# Patient Record
Sex: FEMALE | URBAN - METROPOLITAN AREA
[De-identification: names, ages, dates, MRNs, and addresses within clinical notes are randomized per-mention and may not be internally consistent; named-entity substitution may affect disease eponyms.]

---

## 2020-12-09 ENCOUNTER — NURSE TRIAGE (OUTPATIENT)
Dept: NURSING | Facility: CLINIC | Age: 60
End: 2020-12-09

## 2020-12-10 NOTE — TELEPHONE ENCOUNTER
"Patients daughter May calling, No CTC on file. Obtained consent from patient over the phone with speak with daughter. Patient is not seen through [facility]. Daughter is not sure where pateint is seen and who her PCP is.     -Hx of Diabetes  -COVID-19 positive recently diagnosed.   -Constant cough  -Temperature is 102 F right now.   -Blood Sugar 365 now, patient has a prescription for metformin, but has not been taking her medication as prescribed.  -Patient is slow to respond per daughter and having cloudiness, but daughter (who is in nursing school) states patient is alert and oriented x4, but daughter thinks patient is still \"off,\"   -Patient was constipated per daughter and today had prune juice. Patient vomited x2 about 4 hours or more ago.  -Daughter stated patient is having a very dry mouth and has not had much fluids.      -Patient denies any chest pain, shortness of breath currently.    Per protocol, recommendations are for patient to be seen in ED now. Daughter agrees with disposition and will take patient to ED now. RN advised for daughter/patient to call back with any new or worsening sx once patient is seen in ED.Daughter verbalized understanding and agrees with plan.     Lea Vance RN, BSN Nurse Triage Advisor 8:34 PM 12/9/2020       Reason for Disposition    [1] Vomiting AND [2] signs of dehydration (e.g., very dry mouth, lightheaded, dark urine)    Vomiting lasts > 4 hours    [1] Blood glucose > 240 mg/dL (13.3 mmol/L) AND [2] vomiting AND [3] unable to check for ketones (in blood or urine)    Patient sounds very sick or weak to the triager    Additional Information    Negative: Unconscious or difficult to awaken    Negative: Acting confused (e.g., disoriented, slurred speech)    Negative: Very weak (e.g., can't stand)    Negative: Sounds like a life-threatening emergency to the triager    Negative: [1] Blood glucose > 240 mg/dL (13.3 mmol/L) AND [2] rapid breathing    Negative: [1] Blood " glucose > 240 mg/dL (13.3 mmol/L) AND [2] blood ketones > 1.4 mmol/L    Negative: [1] Blood glucose > 240 mg/dL (13.3 mmol/L) AND [2] urine ketones moderate-large (or more than 1+)    Negative: Blood glucose > 500 mg/dL (27.8 mmol/L)    Negative: [1] New onset Diabetes suspected (e.g., frequent urination, weak, weight loss) AND [2] vomiting or rapid breathing    Protocols used: DIABETES - HIGH BLOOD SUGAR-A-AH    COVID 19 Nurse Triage Plan/Patient Instructions    Please be aware that novel coronavirus (COVID-19) may be circulating in the community. If you develop symptoms such as fever, cough, or SOB or if you have concerns about the presence of another infection including coronavirus (COVID-19), please contact your health care provider or visit [email or web address].     Disposition/Instructions    ED Visit recommended. Follow protocol based instructions.     Bring Your Own Device:  Please also bring your smart device(s) (smart phones, tablets, laptops) and their charging cables for your personal use and to communicate with your care team during your visit.    Thank you for taking steps to prevent the spread of this virus.  o Limit your contact with others.  o Wear a simple mask to cover your cough.  o Wash your hands well and often.    Resources    [facility]: About COVID-19: [email or web address]    CDC: What to Do If You're Sick: www.cdc.gov/coronavirus/2019-ncov/about/steps-when-sick.html    CDC: Ending Home Isolation: www.cdc.gov/coronavirus/2019-ncov/hcp/disposition-in-home-patients.html     CDC: Caring for Someone: www.cdc.gov/coronavirus/2019-ncov/if-you-are-sick/care-for-someone.html     [facility]: Interim Guidance for Hospital Discharge to Home: www.health.[facility].mn.us/diseases/coronavirus/hcp/hospdischarge.pdf    [facility] clinical trials (COVID-19 research studies): clinicalaffairs.[facility]/umn-clinical-trials     Below are the COVID-19 hotlines at the [place] [facility]  ([facility]). Interpreters are available.   o For health questions: Call 188-485-5914 or 1-969.105.4695 (7 a.m. to 7 p.m.)  o For questions about schools and childcare: Call 463-973-7248 or 1-910.867.9509 (7 a.m. to 7 p.m.)

## 2021-04-22 ENCOUNTER — AMBULATORY - HEALTHEAST (OUTPATIENT)
Dept: NURSING | Facility: CLINIC | Age: 61
End: 2021-04-22

## 2021-05-13 ENCOUNTER — AMBULATORY - HEALTHEAST (OUTPATIENT)
Dept: NURSING | Facility: CLINIC | Age: 61
End: 2021-05-13

## 2021-11-16 ENCOUNTER — IMMUNIZATION (OUTPATIENT)
Dept: FAMILY MEDICINE | Facility: CLINIC | Age: 61
End: 2021-11-16
Payer: MEDICARE

## 2021-11-16 PROCEDURE — 0003A PR COVID VAC PFIZER DIL RECON 30 MCG/0.3 ML IM: CPT

## 2021-11-16 PROCEDURE — 91300 PR COVID VAC PFIZER DIL RECON 30 MCG/0.3 ML IM: CPT

## 2022-04-08 ENCOUNTER — IMMUNIZATION (OUTPATIENT)
Dept: FAMILY MEDICINE | Facility: CLINIC | Age: 62
End: 2022-04-08
Payer: MEDICARE

## 2022-04-08 PROCEDURE — 0054A COVID-19,PF,PFIZER (12+ YRS): CPT

## 2022-04-08 PROCEDURE — 91305 COVID-19,PF,PFIZER (12+ YRS): CPT

## 2022-04-08 PROCEDURE — 99207 PR NO CHARGE LOS: CPT

## 2022-10-10 ENCOUNTER — IMMUNIZATION (OUTPATIENT)
Dept: FAMILY MEDICINE | Facility: CLINIC | Age: 62
End: 2022-10-10
Payer: MEDICARE

## 2022-10-10 DIAGNOSIS — Z23 NEED FOR PROPHYLACTIC VACCINATION AND INOCULATION AGAINST INFLUENZA: Primary | ICD-10-CM

## 2022-10-10 PROCEDURE — 91312 COVID-19,PF,PFIZER BOOSTER BIVALENT: CPT

## 2022-10-10 PROCEDURE — 90682 RIV4 VACC RECOMBINANT DNA IM: CPT

## 2022-10-10 PROCEDURE — 0124A COVID-19,PF,PFIZER BOOSTER BIVALENT: CPT

## 2022-10-10 PROCEDURE — G0008 ADMIN INFLUENZA VIRUS VAC: HCPCS

## 2025-03-24 ENCOUNTER — APPOINTMENT (OUTPATIENT)
Dept: RADIOLOGY | Facility: HOSPITAL | Age: 65
DRG: 482 | End: 2025-03-24
Attending: STUDENT IN AN ORGANIZED HEALTH CARE EDUCATION/TRAINING PROGRAM
Payer: MEDICARE

## 2025-03-24 ENCOUNTER — HOSPITAL ENCOUNTER (INPATIENT)
Facility: HOSPITAL | Age: 65
DRG: 482 | End: 2025-03-24
Attending: STUDENT IN AN ORGANIZED HEALTH CARE EDUCATION/TRAINING PROGRAM
Payer: MEDICARE

## 2025-03-24 ENCOUNTER — APPOINTMENT (OUTPATIENT)
Dept: CT IMAGING | Facility: HOSPITAL | Age: 65
DRG: 482 | End: 2025-03-24
Attending: STUDENT IN AN ORGANIZED HEALTH CARE EDUCATION/TRAINING PROGRAM
Payer: MEDICARE

## 2025-03-24 ENCOUNTER — APPOINTMENT (OUTPATIENT)
Dept: MRI IMAGING | Facility: HOSPITAL | Age: 65
DRG: 482 | End: 2025-03-24
Attending: STUDENT IN AN ORGANIZED HEALTH CARE EDUCATION/TRAINING PROGRAM
Payer: MEDICARE

## 2025-03-24 DIAGNOSIS — M25.552 HIP PAIN, LEFT: ICD-10-CM

## 2025-03-24 DIAGNOSIS — S72.145A CLOSED NONDISPLACED INTERTROCHANTERIC FRACTURE OF LEFT FEMUR, INITIAL ENCOUNTER (H): Primary | ICD-10-CM

## 2025-03-24 DIAGNOSIS — S72.002A HIP FRACTURE, LEFT, CLOSED, INITIAL ENCOUNTER (H): ICD-10-CM
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
## 2025-03-24 DIAGNOSIS — M80.00XA AGE-RELATED OSTEOPOROSIS WITH CURRENT PATHOLOGICAL FRACTURE, INITIAL ENCOUNTER: ICD-10-CM

## 2025-03-24 DIAGNOSIS — E11.65 UNCONTROLLED TYPE 2 DIABETES MELLITUS WITH HYPERGLYCEMIA (H): ICD-10-CM

## 2025-03-24 LAB
ALBUMIN SERPL BCG-MCNC: 3.7 G/DL (ref 3.5–5.2)
ALBUMIN UR-MCNC: NEGATIVE MG/DL
ALP SERPL-CCNC: 132 U/L (ref 40–150)
ALT SERPL W P-5'-P-CCNC: 14 U/L (ref 0–50)
ANION GAP SERPL CALCULATED.3IONS-SCNC: 12 MMOL/L (ref 7–15)
APPEARANCE UR: CLEAR
AST SERPL W P-5'-P-CCNC: 13 U/L (ref 0–45)
BACTERIA #/AREA URNS HPF: ABNORMAL /HPF
BASOPHILS # BLD AUTO: 0 10E3/UL (ref 0–0.2)
BASOPHILS NFR BLD AUTO: 1 %
BILIRUB SERPL-MCNC: 0.4 MG/DL
BILIRUB UR QL STRIP: NEGATIVE
BUN SERPL-MCNC: 33.7 MG/DL (ref 8–23)
CALCIUM SERPL-MCNC: 9.2 MG/DL (ref 8.8–10.4)
CHLORIDE SERPL-SCNC: 98 MMOL/L (ref 98–107)
COLOR UR AUTO: COLORLESS
CREAT SERPL-MCNC: 1.46 MG/DL (ref 0.51–0.95)
EGFRCR SERPLBLD CKD-EPI 2021: 39 ML/MIN/1.73M2
EOSINOPHIL # BLD AUTO: 0.3 10E3/UL (ref 0–0.7)
EOSINOPHIL NFR BLD AUTO: 3 %
ERYTHROCYTE [DISTWIDTH] IN BLOOD BY AUTOMATED COUNT: 11.7 % (ref 10–15)
EST. AVERAGE GLUCOSE BLD GHB EST-MCNC: 321 MG/DL
GLUCOSE BLDC GLUCOMTR-MCNC: 304 MG/DL (ref 70–99)
GLUCOSE BLDC GLUCOMTR-MCNC: 520 MG/DL (ref 70–99)
GLUCOSE SERPL-MCNC: 573 MG/DL (ref 70–99)
GLUCOSE UR STRIP-MCNC: >1000 MG/DL
HBA1C MFR BLD: 12.8 %
HCO3 SERPL-SCNC: 20 MMOL/L (ref 22–29)
HCT VFR BLD AUTO: 30.1 % (ref 35–47)
HGB BLD-MCNC: 10.9 G/DL (ref 11.7–15.7)
HGB UR QL STRIP: NEGATIVE
IMM GRANULOCYTES # BLD: 0 10E3/UL
IMM GRANULOCYTES NFR BLD: 0 %
KETONES UR STRIP-MCNC: NEGATIVE MG/DL
LEUKOCYTE ESTERASE UR QL STRIP: ABNORMAL
LYMPHOCYTES # BLD AUTO: 1.4 10E3/UL (ref 0.8–5.3)
LYMPHOCYTES NFR BLD AUTO: 17 %
MCH RBC QN AUTO: 30.9 PG (ref 26.5–33)
MCHC RBC AUTO-ENTMCNC: 36.2 G/DL (ref 31.5–36.5)
MCV RBC AUTO: 85 FL (ref 78–100)
MONOCYTES # BLD AUTO: 0.6 10E3/UL (ref 0–1.3)
MONOCYTES NFR BLD AUTO: 7 %
NEUTROPHILS # BLD AUTO: 5.8 10E3/UL (ref 1.6–8.3)
NEUTROPHILS NFR BLD AUTO: 72 %
NITRATE UR QL: NEGATIVE
NRBC # BLD AUTO: 0 10E3/UL
NRBC BLD AUTO-RTO: 0 /100
PH UR STRIP: 5.5 [PH] (ref 5–7)
PLATELET # BLD AUTO: 150 10E3/UL (ref 150–450)
POTASSIUM SERPL-SCNC: 4.5 MMOL/L (ref 3.4–5.3)
PROT SERPL-MCNC: 6.8 G/DL (ref 6.4–8.3)
RBC # BLD AUTO: 3.53 10E6/UL (ref 3.8–5.2)
RBC URINE: 1 /HPF
SODIUM SERPL-SCNC: 130 MMOL/L (ref 135–145)
SP GR UR STRIP: 1.02 (ref 1–1.03)
UROBILINOGEN UR STRIP-MCNC: NORMAL MG/DL
WBC # BLD AUTO: 8.1 10E3/UL (ref 4–11)
WBC URINE: 37 /HPF

## 2025-03-24 PROCEDURE — 120N000001 HC R&B MED SURG/OB

## 2025-03-24 PROCEDURE — 82962 GLUCOSE BLOOD TEST: CPT

## 2025-03-24 PROCEDURE — 258N000003 HC RX IP 258 OP 636: Performed by: STUDENT IN AN ORGANIZED HEALTH CARE EDUCATION/TRAINING PROGRAM

## 2025-03-24 PROCEDURE — 250N000013 HC RX MED GY IP 250 OP 250 PS 637: Performed by: STUDENT IN AN ORGANIZED HEALTH CARE EDUCATION/TRAINING PROGRAM

## 2025-03-24 PROCEDURE — 250N000012 HC RX MED GY IP 250 OP 636 PS 637

## 2025-03-24 PROCEDURE — 81003 URINALYSIS AUTO W/O SCOPE: CPT | Performed by: EMERGENCY MEDICINE

## 2025-03-24 PROCEDURE — 36415 COLL VENOUS BLD VENIPUNCTURE: CPT | Performed by: STUDENT IN AN ORGANIZED HEALTH CARE EDUCATION/TRAINING PROGRAM

## 2025-03-24 PROCEDURE — 71046 X-RAY EXAM CHEST 2 VIEWS: CPT

## 2025-03-24 PROCEDURE — 83036 HEMOGLOBIN GLYCOSYLATED A1C: CPT

## 2025-03-24 PROCEDURE — 85025 COMPLETE CBC W/AUTO DIFF WBC: CPT | Performed by: STUDENT IN AN ORGANIZED HEALTH CARE EDUCATION/TRAINING PROGRAM

## 2025-03-24 PROCEDURE — 250N000013 HC RX MED GY IP 250 OP 250 PS 637

## 2025-03-24 PROCEDURE — 70450 CT HEAD/BRAIN W/O DYE: CPT

## 2025-03-24 PROCEDURE — 73700 CT LOWER EXTREMITY W/O DYE: CPT | Mod: LT

## 2025-03-24 PROCEDURE — 87086 URINE CULTURE/COLONY COUNT: CPT | Performed by: EMERGENCY MEDICINE

## 2025-03-24 PROCEDURE — 73721 MRI JNT OF LWR EXTRE W/O DYE: CPT | Mod: LT

## 2025-03-24 PROCEDURE — 72125 CT NECK SPINE W/O DYE: CPT

## 2025-03-24 PROCEDURE — 86900 BLOOD TYPING SEROLOGIC ABO: CPT | Performed by: STUDENT IN AN ORGANIZED HEALTH CARE EDUCATION/TRAINING PROGRAM

## 2025-03-24 PROCEDURE — 99285 EMERGENCY DEPT VISIT HI MDM: CPT | Mod: 25

## 2025-03-24 PROCEDURE — 80053 COMPREHEN METABOLIC PANEL: CPT | Performed by: STUDENT IN AN ORGANIZED HEALTH CARE EDUCATION/TRAINING PROGRAM

## 2025-03-24 PROCEDURE — 250N000012 HC RX MED GY IP 250 OP 636 PS 637: Performed by: EMERGENCY MEDICINE

## 2025-03-24 PROCEDURE — 86850 RBC ANTIBODY SCREEN: CPT | Performed by: STUDENT IN AN ORGANIZED HEALTH CARE EDUCATION/TRAINING PROGRAM

## 2025-03-24 RX ORDER — OXYCODONE HYDROCHLORIDE 5 MG/1
5 TABLET ORAL ONCE
Status: COMPLETED | OUTPATIENT
Start: 2025-03-24 | End: 2025-03-24

## 2025-03-24 RX ORDER — OXYCODONE HYDROCHLORIDE 5 MG/1
5 TABLET ORAL EVERY 4 HOURS PRN
Status: DISCONTINUED | OUTPATIENT
Start: 2025-03-24 | End: 2025-03-27

## 2025-03-24 RX ORDER — PROCHLORPERAZINE MALEATE 5 MG/1
5 TABLET ORAL EVERY 6 HOURS PRN
Status: DISCONTINUED | OUTPATIENT
Start: 2025-03-24 | End: 2025-03-28 | Stop reason: HOSPADM

## 2025-03-24 RX ORDER — ACETAMINOPHEN 325 MG/1
975 TABLET ORAL ONCE
Status: COMPLETED | OUTPATIENT
Start: 2025-03-24 | End: 2025-03-24

## 2025-03-24 RX ORDER — ONDANSETRON 4 MG/1
4 TABLET, ORALLY DISINTEGRATING ORAL EVERY 6 HOURS PRN
Status: DISCONTINUED | OUTPATIENT
Start: 2025-03-24 | End: 2025-03-28 | Stop reason: HOSPADM

## 2025-03-24 RX ORDER — AMOXICILLIN 250 MG
2 CAPSULE ORAL 2 TIMES DAILY PRN
Status: DISCONTINUED | OUTPATIENT
Start: 2025-03-24 | End: 2025-03-28 | Stop reason: HOSPADM

## 2025-03-24 RX ORDER — AMOXICILLIN 250 MG
1 CAPSULE ORAL 2 TIMES DAILY PRN
Status: DISCONTINUED | OUTPATIENT
Start: 2025-03-24 | End: 2025-03-28 | Stop reason: HOSPADM

## 2025-03-24 RX ORDER — NICOTINE POLACRILEX 4 MG
15-30 LOZENGE BUCCAL
Status: DISCONTINUED | OUTPATIENT
Start: 2025-03-24 | End: 2025-03-28 | Stop reason: HOSPADM

## 2025-03-24 RX ORDER — NALOXONE HYDROCHLORIDE 0.4 MG/ML
0.4 INJECTION, SOLUTION INTRAMUSCULAR; INTRAVENOUS; SUBCUTANEOUS
Status: DISCONTINUED | OUTPATIENT
Start: 2025-03-24 | End: 2025-03-28 | Stop reason: HOSPADM

## 2025-03-24 RX ORDER — CALCIUM CARBONATE 500 MG/1
1000 TABLET, CHEWABLE ORAL 4 TIMES DAILY PRN
Status: DISCONTINUED | OUTPATIENT
Start: 2025-03-24 | End: 2025-03-28 | Stop reason: HOSPADM

## 2025-03-24 RX ORDER — IBUPROFEN 200 MG
200 TABLET ORAL EVERY 4 HOURS PRN
Status: ON HOLD | COMMUNITY
End: 2025-03-28

## 2025-03-24 RX ORDER — NALOXONE HYDROCHLORIDE 0.4 MG/ML
0.2 INJECTION, SOLUTION INTRAMUSCULAR; INTRAVENOUS; SUBCUTANEOUS
Status: DISCONTINUED | OUTPATIENT
Start: 2025-03-24 | End: 2025-03-28 | Stop reason: HOSPADM

## 2025-03-24 RX ORDER — LIDOCAINE 40 MG/G
CREAM TOPICAL
Status: DISCONTINUED | OUTPATIENT
Start: 2025-03-24 | End: 2025-03-27

## 2025-03-24 RX ORDER — ACETAMINOPHEN 325 MG/1
975 TABLET ORAL EVERY 8 HOURS
Status: COMPLETED | OUTPATIENT
Start: 2025-03-24 | End: 2025-03-26

## 2025-03-24 RX ORDER — DEXTROSE MONOHYDRATE 25 G/50ML
25-50 INJECTION, SOLUTION INTRAVENOUS
Status: DISCONTINUED | OUTPATIENT
Start: 2025-03-24 | End: 2025-03-28 | Stop reason: HOSPADM

## 2025-03-24 RX ORDER — ONDANSETRON 2 MG/ML
4 INJECTION INTRAMUSCULAR; INTRAVENOUS EVERY 6 HOURS PRN
Status: DISCONTINUED | OUTPATIENT
Start: 2025-03-24 | End: 2025-03-28 | Stop reason: HOSPADM

## 2025-03-24 RX ORDER — ACETAMINOPHEN 500 MG
500-1000 TABLET ORAL EVERY 6 HOURS PRN
Status: ON HOLD | COMMUNITY
End: 2025-03-26

## 2025-03-24 RX ADMIN — INSULIN GLARGINE 8 UNITS: 100 INJECTION, SOLUTION SUBCUTANEOUS at 21:58

## 2025-03-24 RX ADMIN — INSULIN ASPART 4 UNITS: 100 INJECTION, SOLUTION INTRAVENOUS; SUBCUTANEOUS at 19:55

## 2025-03-24 RX ADMIN — ACETAMINOPHEN 975 MG: 325 TABLET ORAL at 21:56

## 2025-03-24 RX ADMIN — ACETAMINOPHEN 975 MG: 325 TABLET ORAL at 15:13

## 2025-03-24 RX ADMIN — SODIUM CHLORIDE 1000 ML: 0.9 INJECTION, SOLUTION INTRAVENOUS at 18:22

## 2025-03-24 ASSESSMENT — ACTIVITIES OF DAILY LIVING (ADL)
ADLS_ACUITY_SCORE: 34
ADLS_ACUITY_SCORE: 41
ADLS_ACUITY_SCORE: 34
ADLS_ACUITY_SCORE: 41

## 2025-03-24 ASSESSMENT — COLUMBIA-SUICIDE SEVERITY RATING SCALE - C-SSRS
6. HAVE YOU EVER DONE ANYTHING, STARTED TO DO ANYTHING, OR PREPARED TO DO ANYTHING TO END YOUR LIFE?: NO
2. HAVE YOU ACTUALLY HAD ANY THOUGHTS OF KILLING YOURSELF IN THE PAST MONTH?: NO
1. IN THE PAST MONTH, HAVE YOU WISHED YOU WERE DEAD OR WISHED YOU COULD GO TO SLEEP AND NOT WAKE UP?: NO

## 2025-03-24 NOTE — PHARMACY-ADMISSION MEDICATION HISTORY
Pharmacist Admission Medication History    Admission medication history is complete. The information provided in this note is only as accurate as the sources available at the time of the update.    Information Source(s): Patient, Clinic records, and CareEverywhere/SureScripts via in-person    Pertinent Information: Patient was able to confirm current medications and last known administration times. Patient has not filled any medications in the past year     Changes made to PTA medication list:  Added: All medications below  Deleted: None  Changed: None    Allergies reviewed with patient and updates made in EHR: yes    Medication History Completed By: Max Barrett [facility] 3/24/2025 4:32 PM    PTA Med List   Medication Sig Last Dose/Taking    acetaminophen (TYLENOL) 500 MG tablet Take 500-1,000 mg by mouth every 6 hours as needed for mild pain. Taking As Needed    ibuprofen (ADVIL/MOTRIN) 200 MG tablet Take 200 mg by mouth every 4 hours as needed for pain. Taking As Needed

## 2025-03-24 NOTE — ED TRIAGE NOTES
Pt fell on Friday on a slippery floor and landed on her left hip. Was not evaluated at that time but since the accident her left hip has been painful. Her pain is 3/10 at rest and 9/10 when she's walking. Not n blood thinners.  Pt speaks hmong only. No pain meds today.

## 2025-03-24 NOTE — ED NOTES
EMERGENCY DEPARTMENT SIGN OUT NOTE        ED COURSE AND MEDICAL DECISION MAKING  Patient was signed out to me by Dr Deanne Naik at 6:00 PM  6:35 PM MR showing extension into intertroch, nondisplaced. Will discuss with [facility].  6:44 PM Discussed the case with [facility]. 2 options, nonweightbearing for at least 6 weeks on fractured hip vs surgery (possibly tomorrow) and walking right away.  6:46 PM Updated the patient on the current treatment plan and admission.  Additional family members to be looped in and pt to make decision. Will need hospitalization either way.  Family here confirms pt has not been on DM meds for several months.  7:26 PM Pt and family still not certain on decision of surgery vs nonweightbearing for 6 weeks or so.  Pt will need better glucose control either way, which we discussed.  Will hospitalize for further medical management.  Ortho would not take for surgery, if pt decides on surgery, until sugars better controlled.  7:31 PM Discussed the case with Dr. Thiel, [place], who agrees to accept the patient at this time.    In brief, Ze Pabon is a 65 year old female who initially presented for evaluation of fall. Patient fell on 3/18 while changing in a changing room, falling sideways hitting her knee and hip. Since then, she has had pain walking, bending and abducting left leg. Ibuprofen for pain, last dose 2100 yesterday. See initial ED note for further detail.    At time of sign out, disposition was pending MRI.     FINAL IMPRESSION    1. Hip fracture, left, closed, initial encounter (H)    2. Hip pain, left    3. Fall, initial encounter    4. Closed nondisplaced intertrochanteric fracture of left femur, initial encounter (H)    5. Uncontrolled type 2 diabetes mellitus with hyperglycemia (H)        ED MEDS  Medications   lidocaine 1 % 0.1-1 mL (has no administration in time range)   lidocaine (LMX4) cream (has no administration in time range)   sodium chloride (PF) 0.9% PF  flush 3 mL (has no administration in time range)   sodium chloride (PF) 0.9% PF flush 3 mL (has no administration in time range)   senna-docusate (SENOKOT-S/PERICOLACE) 8.6-50 MG per tablet 1 tablet (has no administration in time range)     Or   senna-docusate (SENOKOT-S/PERICOLACE) 8.6-50 MG per tablet 2 tablet (has no administration in time range)   calcium carbonate (TUMS) chewable tablet 1,000 mg (has no administration in time range)   glucose gel 15-30 g (has no administration in time range)     Or   dextrose 50 % injection 25-50 mL (has no administration in time range)     Or   glucagon injection 1 mg (has no administration in time range)   HOLD: ALL Anticoagulant medications until AFTER surgery (has no administration in time range)   ondansetron (ZOFRAN ODT) ODT tab 4 mg (has no administration in time range)     Or   ondansetron (ZOFRAN) injection 4 mg (has no administration in time range)   prochlorperazine (COMPAZINE) injection 5 mg (has no administration in time range)     Or   prochlorperazine (COMPAZINE) tablet 5 mg (has no administration in time range)   melatonin tablet 1 mg (has no administration in time range)   insulin glargine (LANTUS PEN) injection 8 Units (has no administration in time range)   insulin aspart (NovoLOG) injection (RAPID ACTING) (has no administration in time range)   oxyCODONE IR (ROXICODONE) half-tab 2.5 mg (has no administration in time range)     Or   oxyCODONE (ROXICODONE) tablet 5 mg (has no administration in time range)   acetaminophen (TYLENOL) tablet 975 mg (has no administration in time range)   acetaminophen (TYLENOL) tablet 975 mg (975 mg Oral $Given 3/24/25 1513)   oxyCODONE (ROXICODONE) tablet 5 mg (5 mg Oral Not Given 3/24/25 1711)   sodium chloride 0.9% BOLUS 1,000 mL (0 mLs Intravenous Stopped 3/24/25 1848)   insulin aspart (NovoLOG) injection (RAPID ACTING) (4 Units Subcutaneous $Given 3/24/25 1955)       LAB  Labs Ordered and Resulted from Time of ED Arrival to  Time of ED Departure   COMPREHENSIVE METABOLIC PANEL - Abnormal       Result Value    Sodium 130 (*)     Potassium 4.5      Carbon Dioxide (CO2) 20 (*)     Anion Gap 12      Urea Nitrogen 33.7 (*)     Creatinine 1.46 (*)     GFR Estimate 39 (*)     Calcium 9.2      Chloride 98      Glucose 573 (*)     Alkaline Phosphatase 132      AST 13      ALT 14      Protein Total 6.8      Albumin 3.7      Bilirubin Total 0.4     GLUCOSE BY METER - Abnormal    GLUCOSE BY METER POCT 520 (*)    CBC WITH PLATELETS AND DIFFERENTIAL - Abnormal    WBC Count 8.1      RBC Count 3.53 (*)     Hemoglobin 10.9 (*)     Hematocrit 30.1 (*)     MCV 85      MCH 30.9      MCHC 36.2      RDW 11.7      Platelet Count 150      % Neutrophils 72      % Lymphocytes 17      % Monocytes 7      % Eosinophils 3      % Basophils 1      % Immature Granulocytes 0      NRBCs per 100 WBC 0      Absolute Neutrophils 5.8      Absolute Lymphocytes 1.4      Absolute Monocytes 0.6      Absolute Eosinophils 0.3      Absolute Basophils 0.0      Absolute Immature Granulocytes 0.0      Absolute NRBCs 0.0     ROUTINE UA WITH MICROSCOPIC REFLEX TO CULTURE - Abnormal    Color Urine Colorless      Appearance Urine Clear      Glucose Urine >1000 (*)     Bilirubin Urine Negative      Ketones Urine Negative      Specific Gravity Urine 1.022      Blood Urine Negative      pH Urine 5.5      Protein Albumin Urine Negative      Urobilinogen Urine Normal      Nitrite Urine Negative      Leukocyte Esterase Urine 250 Ascencion/uL (*)     Bacteria Urine Few (*)     RBC Urine 1      WBC Urine 37 (*)    HEMOGLOBIN A1C - Abnormal    Estimated Average Glucose 321 (*)     Hemoglobin A1C 12.8 (*)    GLUCOSE MONITOR NURSING POCT   URINE CULTURE     RADIOLOGY    MR Hip Left w/o Contrast   Final Result   IMPRESSION:   1.  Proximal left femoral fracture with mildly displaced involvement of the greater trochanter and nondisplaced extension into the intertrochanteric femur. The lesser trochanter  spared.   2.  Mild underlying left hip osteoarthritis with a small joint effusion.         Chest XR,  PA & LAT   Final Result   IMPRESSION: Stable chest with no acute cardiopulmonary or musculoskeletal abnormalities. Mildly calcified thoracic aortic arch. Slight thoracolumbar spinal curvature.      CT Hip Left w/o Contrast   Final Result   IMPRESSION:   1.  Mildly displaced fracture through the base of the greater trochanter. No definite intertrochanteric extension, however, there is increased density of the intramedullary canal in the intertrochanteric region which may reflect edema and possibly an    occult fracture. MRI would be more sensitive for evaluation if clinically indicated.   2.  Mild degenerative arthrosis of the left hip.      NOTE: ABNORMAL REPORT      THE DICTATION ABOVE DESCRIBES AN ABNORMALITY FOR WHICH FOLLOW-UP IS NEEDED.           CT Cervical Spine w/o Contrast   Final Result   IMPRESSION:   HEAD CT:   1.  No acute intracranial abnormality.   2.  Chronic aged-related intracranial changes.      CERVICAL SPINE CT:   1.  No CT evidence for acute fracture or post traumatic subluxation.   2.  Diffuse osseous demineralization and multilevel spondylosis, as described.      Head CT w/o contrast   Final Result   IMPRESSION:   HEAD CT:   1.  No acute intracranial abnormality.   2.  Chronic aged-related intracranial changes.      CERVICAL SPINE CT:   1.  No CT evidence for acute fracture or post traumatic subluxation.   2.  Diffuse osseous demineralization and multilevel spondylosis, as described.          DISCHARGE MEDS  Current Discharge Medication List        DO [facility] EMERGENCY DEPARTMENT  [address] [place] [id number]  [phone]         Jairo Babb MD  03/24/25 3676

## 2025-03-24 NOTE — ED PROVIDER NOTES
NAME: Ze Pabon  AGE: 65 year old female  YOB: 1960  MRN: [id number]  EVALUATION DATE & TIME: 3/24/2025  2:39 PM    PCP: No Ref-Primary, Physician  ED PROVIDER: Deanne Naik MD.    Chief Complaint   Patient presents with    Fall       FINAL IMPRESSION:  1. Hip pain, left    2. Fall, initial encounter      MEDICAL DECISION MAKIN:57 PM I met with the patient, obtained history, performed an initial exam, and discussed options and plan for diagnostics and treatment here in the ED.   4:19 PM Updated patient  4:31 PM Discussed with Dr. Smith from [facility]  4:42 PM Updated patient   5:15 PM Nursing checked blood sugar and it is in 500s. Labs and fluids ordered.    66 y/o F with h/o T2DM, HTN, HLP who presents with hip pain. On 3/18/25 patient was in the dressing room of a store when she was trying on pants and fell landed on her left hip.  She did hit her head without loss of consciousness.  She feels a little dizzy after the fall but had no symptoms preceding fall.  She is here for ongoing pain to her left lateral hip. Neurovascularly intact.    Given age/fall did get CT head/neck which did not show acute findings.  Has a little bit of tenderness to her left lateral ribs.  Chest x-ray without acute findings    On exam has focal pain to the left lateral hip  CT of the left hip showed: Mildly displaced fracture through the base of the greater trochanter. No definite intertrochanteric extension, however, there is increased density of the intramedullary canal in the intertrochanteric region which may reflect edema and possibly an   occult fracture. MRI would be more sensitive for evaluation if clinically indicated    [facility] consulted and recommends MRI. Patient signed out at the end of my shift to Dr. Babb pending MRI results and further orthopedic recommendations as well as labs.      Medical Decision Making  Obtained supplemental history:Supplemental history obtained?: Family  Member/Significant Other  Reviewed external records: External records reviewed?: No  Care impacted by chronic illness:Diabetes, Hyperlipidemia, Hypertension, and Mental Health  Did you consider but not order tests?: Work up considered but not performed and documented in chart, if applicable  Did you interpret images independently?: Independent interpretation of ECG and images noted in documentation, when applicable.  Consultation discussion with other provider:Did you involve another provider (consultant, , pharmacy, etc.)?: No  Admission considered. Patient was signed out to the oncoming physician, disposition pending.    MIPS: Adult Minor Head Trauma:Age 65 years or older    MEDICATIONS GIVEN IN THE EMERGENCY:  Medications   oxyCODONE (ROXICODONE) tablet 5 mg (has no administration in time range)   acetaminophen (TYLENOL) tablet 975 mg (975 mg Oral $Given 3/24/25 1448)       NEW PRESCRIPTIONS STARTED AT TODAY'S ER VISIT:  New Prescriptions    No medications on file        =================================================================  HPI    Patient information was obtained from: patient and family  Use of : Yes (Phone and family) - Fili Pabon is a 65 year old female with a past medical history of DM2, hypertension, hyperlipidemia, who presents to the ED by walk-in with family for evaluation of fall.    Patient reports left hip pain after falling on 3/18. She says that it hurts to walk. Reports she was in a changing room trying on pants and slipped, falling sideways and hitting her knee and hip. She also reports hitting her head. She felt some dizziness after the fall but it has since gone away. Endorses some left rib pain. Also reports pain when bending her leg and abducting her left leg. Denies abdominal pain and back pain. Has taken Advil and Ibuprofen for her pain, last dose 9:00 PM yesterday. There were no other concerns/complaints at this time.     PHYSICAL EXAM:    Vitals: BP (!)  "176/74   Pulse 103   Temp 98.4  F (36.9  C) (Tympanic)   Resp 12   Ht 1.6 m (5' 3\")   Wt 54 kg (119 lb)   SpO2 99%   BMI 21.08 kg/m     Constitutional: Well developed, well nourished. No acute distress.  HENT: Normocephalic, atraumatic. Neck-gross ROM intact. No C spine tenderness  Eyes: Pupils mid-range, sclera white  Respiratory: CTAB, no respiratory distress  Cardiovascular: Normal heart rate, regular rhythm. No lower extremity edema. Intact left DP/PT pulses  GI: Soft, not distended, non tender, no guarding  Musculoskeletal: Tenderness to the left lateral hip.  No deformities.  Mild tenderness to her left lateral ribs without deformity. otherwise I am able to range her extremities with pain and there is not other focal bony tenderness. No midline C, T or L spine tenderness  Skin: Warm, dry, no rash.  Neurologic: Alert & oriented, speech clear, Cns grossly intact, has some difficulty lifting the left leg off the bed, she is able to but it causes quite a bit of pain.  Otherwise strength and sensation intact in the extremities    LAB:  All pertinent labs reviewed and interpreted.  Labs Ordered and Resulted from Time of ED Arrival to Time of ED Departure - No data to display    RADIOLOGY:  Chest XR,  PA & LAT   Final Result   IMPRESSION: Stable chest with no acute cardiopulmonary or musculoskeletal abnormalities. Mildly calcified thoracic aortic arch. Slight thoracolumbar spinal curvature.      CT Hip Left w/o Contrast   Final Result   IMPRESSION:   1.  Mildly displaced fracture through the base of the greater trochanter. No definite intertrochanteric extension, however, there is increased density of the intramedullary canal in the intertrochanteric region which may reflect edema and possibly an    occult fracture. MRI would be more sensitive for evaluation if clinically indicated.   2.  Mild degenerative arthrosis of the left hip.      NOTE: ABNORMAL REPORT      THE DICTATION ABOVE DESCRIBES AN ABNORMALITY " FOR WHICH FOLLOW-UP IS NEEDED.           CT Cervical Spine w/o Contrast   Final Result   IMPRESSION:   HEAD CT:   1.  No acute intracranial abnormality.   2.  Chronic aged-related intracranial changes.      CERVICAL SPINE CT:   1.  No CT evidence for acute fracture or post traumatic subluxation.   2.  Diffuse osseous demineralization and multilevel spondylosis, as described.      Head CT w/o contrast   Final Result   IMPRESSION:   HEAD CT:   1.  No acute intracranial abnormality.   2.  Chronic aged-related intracranial changes.      CERVICAL SPINE CT:   1.  No CT evidence for acute fracture or post traumatic subluxation.   2.  Diffuse osseous demineralization and multilevel spondylosis, as described.      MR Hip Left w/o Contrast    (Results Pending)     I, Cynthia Camacho, am serving as a scribe to document services personally performed by Dr. Deanne Naik based on my observation and the provider's statements to me. I, Deanne Naik MD attest that Cynthia Camacho is acting in a scribe capacity, has observed my performance of the services and has documented them in accordance with my direction.    Deanne Naik M.D.  Emergency Medicine  [facility] EMERGENCY DEPARTMENT  [address] [place] [id number]  [phone]  Dept: [phone]     Deanne Naik MD  03/24/25 0050       Deanne Naik MD  03/24/25 1039

## 2025-03-25 ENCOUNTER — APPOINTMENT (OUTPATIENT)
Dept: RADIOLOGY | Facility: HOSPITAL | Age: 65
DRG: 482 | End: 2025-03-25
Attending: STUDENT IN AN ORGANIZED HEALTH CARE EDUCATION/TRAINING PROGRAM
Payer: MEDICARE

## 2025-03-25 ENCOUNTER — ANESTHESIA (OUTPATIENT)
Dept: SURGERY | Facility: HOSPITAL | Age: 65
End: 2025-03-25

## 2025-03-25 ENCOUNTER — ANESTHESIA EVENT (OUTPATIENT)
Dept: SURGERY | Facility: HOSPITAL | Age: 65
End: 2025-03-25

## 2025-03-25 ENCOUNTER — VIRTUAL VISIT (OUTPATIENT)
Dept: INTERPRETER SERVICES | Facility: CLINIC | Age: 65
End: 2025-03-25
Payer: MEDICARE

## 2025-03-25 LAB
ABO + RH BLD: NORMAL
ABO + RH BLD: NORMAL
ALBUMIN SERPL BCG-MCNC: 3.5 G/DL (ref 3.5–5.2)
ALP SERPL-CCNC: 107 U/L (ref 40–150)
ALT SERPL W P-5'-P-CCNC: 14 U/L (ref 0–50)
ANION GAP SERPL CALCULATED.3IONS-SCNC: 11 MMOL/L (ref 7–15)
ANION GAP SERPL CALCULATED.3IONS-SCNC: 11 MMOL/L (ref 7–15)
AST SERPL W P-5'-P-CCNC: 18 U/L (ref 0–45)
BILIRUB SERPL-MCNC: 0.5 MG/DL
BLD GP AB SCN SERPL QL: NEGATIVE
BLD GP AB SCN SERPL QL: NEGATIVE
BUN SERPL-MCNC: 28.7 MG/DL (ref 8–23)
BUN SERPL-MCNC: 28.7 MG/DL (ref 8–23)
CALCIUM SERPL-MCNC: 9.1 MG/DL (ref 8.8–10.4)
CALCIUM SERPL-MCNC: 9.1 MG/DL (ref 8.8–10.4)
CHLORIDE SERPL-SCNC: 107 MMOL/L (ref 98–107)
CHLORIDE SERPL-SCNC: 107 MMOL/L (ref 98–107)
CREAT SERPL-MCNC: 1.3 MG/DL (ref 0.51–0.95)
CREAT SERPL-MCNC: 1.3 MG/DL (ref 0.51–0.95)
EGFRCR SERPLBLD CKD-EPI 2021: 45 ML/MIN/1.73M2
EGFRCR SERPLBLD CKD-EPI 2021: 45 ML/MIN/1.73M2
ERYTHROCYTE [DISTWIDTH] IN BLOOD BY AUTOMATED COUNT: 11.5 % (ref 10–15)
GLUCOSE BLDC GLUCOMTR-MCNC: 156 MG/DL (ref 70–99)
GLUCOSE BLDC GLUCOMTR-MCNC: 172 MG/DL (ref 70–99)
GLUCOSE BLDC GLUCOMTR-MCNC: 174 MG/DL (ref 70–99)
GLUCOSE BLDC GLUCOMTR-MCNC: 177 MG/DL (ref 70–99)
GLUCOSE BLDC GLUCOMTR-MCNC: 216 MG/DL (ref 70–99)
GLUCOSE BLDC GLUCOMTR-MCNC: 246 MG/DL (ref 70–99)
GLUCOSE BLDC GLUCOMTR-MCNC: 248 MG/DL (ref 70–99)
GLUCOSE BLDC GLUCOMTR-MCNC: 342 MG/DL (ref 70–99)
GLUCOSE BLDC GLUCOMTR-MCNC: 425 MG/DL (ref 70–99)
GLUCOSE SERPL-MCNC: 162 MG/DL (ref 70–99)
GLUCOSE SERPL-MCNC: 162 MG/DL (ref 70–99)
HCO3 SERPL-SCNC: 20 MMOL/L (ref 22–29)
HCO3 SERPL-SCNC: 20 MMOL/L (ref 22–29)
HCT VFR BLD AUTO: 27.1 % (ref 35–47)
HGB BLD-MCNC: 9.9 G/DL (ref 11.7–15.7)
INR PPP: 1.07 (ref 0.85–1.15)
MCH RBC QN AUTO: 31 PG (ref 26.5–33)
MCHC RBC AUTO-ENTMCNC: 36.5 G/DL (ref 31.5–36.5)
MCV RBC AUTO: 85 FL (ref 78–100)
PHOSPHATE SERPL-MCNC: 3.3 MG/DL (ref 2.5–4.5)
PLATELET # BLD AUTO: 153 10E3/UL (ref 150–450)
POTASSIUM SERPL-SCNC: 3.8 MMOL/L (ref 3.4–5.3)
POTASSIUM SERPL-SCNC: 3.8 MMOL/L (ref 3.4–5.3)
PROT SERPL-MCNC: 6.2 G/DL (ref 6.4–8.3)
RBC # BLD AUTO: 3.19 10E6/UL (ref 3.8–5.2)
SODIUM SERPL-SCNC: 138 MMOL/L (ref 135–145)
SODIUM SERPL-SCNC: 138 MMOL/L (ref 135–145)
SPECIMEN EXP DATE BLD: NORMAL
SPECIMEN EXP DATE BLD: NORMAL
TSH SERPL DL<=0.005 MIU/L-ACNC: 3.88 UIU/ML (ref 0.3–4.2)
VIT D+METAB SERPL-MCNC: 16 NG/ML (ref 20–50)
WBC # BLD AUTO: 6.5 10E3/UL (ref 4–11)

## 2025-03-25 PROCEDURE — C1713 ANCHOR/SCREW BN/BN,TIS/BN: HCPCS | Performed by: STUDENT IN AN ORGANIZED HEALTH CARE EDUCATION/TRAINING PROGRAM

## 2025-03-25 PROCEDURE — 272N000001 HC OR GENERAL SUPPLY STERILE: Performed by: STUDENT IN AN ORGANIZED HEALTH CARE EDUCATION/TRAINING PROGRAM

## 2025-03-25 PROCEDURE — T1013 SIGN LANG/ORAL INTERPRETER: HCPCS | Mod: U4,TEL,95 | Performed by: INTERPRETER

## 2025-03-25 PROCEDURE — 710N000009 HC RECOVERY PHASE 1, LEVEL 1, PER MIN: Performed by: STUDENT IN AN ORGANIZED HEALTH CARE EDUCATION/TRAINING PROGRAM

## 2025-03-25 PROCEDURE — 999N000141 HC STATISTIC PRE-PROCEDURE NURSING ASSESSMENT: Performed by: STUDENT IN AN ORGANIZED HEALTH CARE EDUCATION/TRAINING PROGRAM

## 2025-03-25 PROCEDURE — 250N000013 HC RX MED GY IP 250 OP 250 PS 637

## 2025-03-25 PROCEDURE — 258N000003 HC RX IP 258 OP 636: Performed by: NURSE ANESTHETIST, CERTIFIED REGISTERED

## 2025-03-25 PROCEDURE — 86850 RBC ANTIBODY SCREEN: CPT | Performed by: PHYSICIAN ASSISTANT

## 2025-03-25 PROCEDURE — 85027 COMPLETE CBC AUTOMATED: CPT

## 2025-03-25 PROCEDURE — 84443 ASSAY THYROID STIM HORMONE: CPT

## 2025-03-25 PROCEDURE — 250N000011 HC RX IP 250 OP 636: Performed by: STUDENT IN AN ORGANIZED HEALTH CARE EDUCATION/TRAINING PROGRAM

## 2025-03-25 PROCEDURE — 80048 BASIC METABOLIC PNL TOTAL CA: CPT

## 2025-03-25 PROCEDURE — 84295 ASSAY OF SERUM SODIUM: CPT

## 2025-03-25 PROCEDURE — 84132 ASSAY OF SERUM POTASSIUM: CPT

## 2025-03-25 PROCEDURE — 360N000084 HC SURGERY LEVEL 4 W/ FLUORO, PER MIN: Performed by: STUDENT IN AN ORGANIZED HEALTH CARE EDUCATION/TRAINING PROGRAM

## 2025-03-25 PROCEDURE — 250N000009 HC RX 250: Performed by: NURSE ANESTHETIST, CERTIFIED REGISTERED

## 2025-03-25 PROCEDURE — 120N000001 HC R&B MED SURG/OB

## 2025-03-25 PROCEDURE — 250N000011 HC RX IP 250 OP 636: Performed by: PAIN MEDICINE

## 2025-03-25 PROCEDURE — 370N000017 HC ANESTHESIA TECHNICAL FEE, PER MIN: Performed by: STUDENT IN AN ORGANIZED HEALTH CARE EDUCATION/TRAINING PROGRAM

## 2025-03-25 PROCEDURE — 84155 ASSAY OF PROTEIN SERUM: CPT

## 2025-03-25 PROCEDURE — 82306 VITAMIN D 25 HYDROXY: CPT

## 2025-03-25 PROCEDURE — 250N000013 HC RX MED GY IP 250 OP 250 PS 637: Performed by: STUDENT IN AN ORGANIZED HEALTH CARE EDUCATION/TRAINING PROGRAM

## 2025-03-25 PROCEDURE — 36415 COLL VENOUS BLD VENIPUNCTURE: CPT

## 2025-03-25 PROCEDURE — 250N000011 HC RX IP 250 OP 636: Performed by: NURSE ANESTHETIST, CERTIFIED REGISTERED

## 2025-03-25 PROCEDURE — 258N000003 HC RX IP 258 OP 636: Performed by: STUDENT IN AN ORGANIZED HEALTH CARE EDUCATION/TRAINING PROGRAM

## 2025-03-25 PROCEDURE — 84100 ASSAY OF PHOSPHORUS: CPT

## 2025-03-25 PROCEDURE — 86900 BLOOD TYPING SEROLOGIC ABO: CPT | Performed by: PHYSICIAN ASSISTANT

## 2025-03-25 PROCEDURE — 0QS736Z REPOSITION LEFT UPPER FEMUR WITH INTRAMEDULLARY INTERNAL FIXATION DEVICE, PERCUTANEOUS APPROACH: ICD-10-PCS | Performed by: STUDENT IN AN ORGANIZED HEALTH CARE EDUCATION/TRAINING PROGRAM

## 2025-03-25 PROCEDURE — 250N000025 HC SEVOFLURANE, PER MIN: Performed by: STUDENT IN AN ORGANIZED HEALTH CARE EDUCATION/TRAINING PROGRAM

## 2025-03-25 PROCEDURE — 999N000180 XR SURGERY CARM FLUORO LESS THAN 5 MIN

## 2025-03-25 PROCEDURE — 85610 PROTHROMBIN TIME: CPT

## 2025-03-25 PROCEDURE — C1769 GUIDE WIRE: HCPCS | Performed by: STUDENT IN AN ORGANIZED HEALTH CARE EDUCATION/TRAINING PROGRAM

## 2025-03-25 DEVICE — LOCKING SCREW FOR IM NAIL Ø 5.0MM / L 36MM / XL25/ STER: Type: IMPLANTABLE DEVICE | Site: HIP | Status: FUNCTIONAL

## 2025-03-25 DEVICE — 10MM/130 DEG TI CANN TFNA 170MM - STERILE
Type: IMPLANTABLE DEVICE | Site: HIP | Status: FUNCTIONAL
Brand: TFN-ADVANCE

## 2025-03-25 DEVICE — TFNA FENESTRATED SCREW 90MM - STERILE
Type: IMPLANTABLE DEVICE | Site: HIP | Status: FUNCTIONAL
Brand: TFN-ADVANCE

## 2025-03-25 RX ORDER — NALOXONE HYDROCHLORIDE 0.4 MG/ML
0.1 INJECTION, SOLUTION INTRAMUSCULAR; INTRAVENOUS; SUBCUTANEOUS
Status: DISCONTINUED | OUTPATIENT
Start: 2025-03-25 | End: 2025-03-25 | Stop reason: HOSPADM

## 2025-03-25 RX ORDER — FENTANYL CITRATE 50 UG/ML
25 INJECTION, SOLUTION INTRAMUSCULAR; INTRAVENOUS EVERY 5 MIN PRN
Status: DISCONTINUED | OUTPATIENT
Start: 2025-03-25 | End: 2025-03-25 | Stop reason: HOSPADM

## 2025-03-25 RX ORDER — TRANEXAMIC ACID 650 MG/1
1950 TABLET ORAL ONCE
Status: COMPLETED | OUTPATIENT
Start: 2025-03-25 | End: 2025-03-25

## 2025-03-25 RX ORDER — LIDOCAINE HYDROCHLORIDE 10 MG/ML
INJECTION, SOLUTION INFILTRATION; PERINEURAL PRN
Status: DISCONTINUED | OUTPATIENT
Start: 2025-03-25 | End: 2025-03-25

## 2025-03-25 RX ORDER — HYDROMORPHONE HCL IN WATER/PF 6 MG/30 ML
0.4 PATIENT CONTROLLED ANALGESIA SYRINGE INTRAVENOUS EVERY 5 MIN PRN
Status: DISCONTINUED | OUTPATIENT
Start: 2025-03-25 | End: 2025-03-25 | Stop reason: HOSPADM

## 2025-03-25 RX ORDER — CEFAZOLIN SODIUM 2 G/50ML
2 SOLUTION INTRAVENOUS EVERY 8 HOURS
Status: COMPLETED | OUTPATIENT
Start: 2025-03-26 | End: 2025-03-26

## 2025-03-25 RX ORDER — ONDANSETRON 2 MG/ML
INJECTION INTRAMUSCULAR; INTRAVENOUS PRN
Status: DISCONTINUED | OUTPATIENT
Start: 2025-03-25 | End: 2025-03-25

## 2025-03-25 RX ORDER — PROPOFOL 10 MG/ML
INJECTION, EMULSION INTRAVENOUS CONTINUOUS PRN
Status: DISCONTINUED | OUTPATIENT
Start: 2025-03-25 | End: 2025-03-25

## 2025-03-25 RX ORDER — CEFAZOLIN SODIUM/WATER 2 G/20 ML
2 SYRINGE (ML) INTRAVENOUS SEE ADMIN INSTRUCTIONS
Status: DISCONTINUED | OUTPATIENT
Start: 2025-03-25 | End: 2025-03-25 | Stop reason: HOSPADM

## 2025-03-25 RX ORDER — HYDROMORPHONE HCL IN WATER/PF 6 MG/30 ML
0.2 PATIENT CONTROLLED ANALGESIA SYRINGE INTRAVENOUS EVERY 5 MIN PRN
Status: DISCONTINUED | OUTPATIENT
Start: 2025-03-25 | End: 2025-03-25 | Stop reason: HOSPADM

## 2025-03-25 RX ORDER — FLUMAZENIL 0.1 MG/ML
0.2 INJECTION, SOLUTION INTRAVENOUS
Status: DISCONTINUED | OUTPATIENT
Start: 2025-03-25 | End: 2025-03-25 | Stop reason: HOSPADM

## 2025-03-25 RX ORDER — LIDOCAINE 40 MG/G
CREAM TOPICAL
Status: DISCONTINUED | OUTPATIENT
Start: 2025-03-25 | End: 2025-03-25 | Stop reason: HOSPADM

## 2025-03-25 RX ORDER — FENTANYL CITRATE 50 UG/ML
50 INJECTION, SOLUTION INTRAMUSCULAR; INTRAVENOUS EVERY 5 MIN PRN
Status: DISCONTINUED | OUTPATIENT
Start: 2025-03-25 | End: 2025-03-25 | Stop reason: HOSPADM

## 2025-03-25 RX ORDER — SODIUM CHLORIDE, SODIUM LACTATE, POTASSIUM CHLORIDE, CALCIUM CHLORIDE 600; 310; 30; 20 MG/100ML; MG/100ML; MG/100ML; MG/100ML
INJECTION, SOLUTION INTRAVENOUS CONTINUOUS
Status: DISCONTINUED | OUTPATIENT
Start: 2025-03-25 | End: 2025-03-25 | Stop reason: HOSPADM

## 2025-03-25 RX ORDER — ONDANSETRON 2 MG/ML
4 INJECTION INTRAMUSCULAR; INTRAVENOUS EVERY 30 MIN PRN
Status: DISCONTINUED | OUTPATIENT
Start: 2025-03-25 | End: 2025-03-25 | Stop reason: HOSPADM

## 2025-03-25 RX ORDER — LABETALOL HYDROCHLORIDE 5 MG/ML
10 INJECTION, SOLUTION INTRAVENOUS ONCE
Status: COMPLETED | OUTPATIENT
Start: 2025-03-25 | End: 2025-03-25

## 2025-03-25 RX ORDER — ROPIVACAINE HYDROCHLORIDE 5 MG/ML
INJECTION, SOLUTION EPIDURAL; INFILTRATION; PERINEURAL
Status: COMPLETED | OUTPATIENT
Start: 2025-03-25 | End: 2025-03-25

## 2025-03-25 RX ORDER — LIDOCAINE 40 MG/G
CREAM TOPICAL
Status: DISCONTINUED | OUTPATIENT
Start: 2025-03-25 | End: 2025-03-28 | Stop reason: HOSPADM

## 2025-03-25 RX ORDER — ONDANSETRON 4 MG/1
4 TABLET, ORALLY DISINTEGRATING ORAL EVERY 30 MIN PRN
Status: DISCONTINUED | OUTPATIENT
Start: 2025-03-25 | End: 2025-03-25 | Stop reason: HOSPADM

## 2025-03-25 RX ORDER — ASPIRIN 81 MG/1
81 TABLET ORAL 2 TIMES DAILY
Status: DISCONTINUED | OUTPATIENT
Start: 2025-03-25 | End: 2025-03-28 | Stop reason: HOSPADM

## 2025-03-25 RX ORDER — PROPOFOL 10 MG/ML
INJECTION, EMULSION INTRAVENOUS PRN
Status: DISCONTINUED | OUTPATIENT
Start: 2025-03-25 | End: 2025-03-25

## 2025-03-25 RX ORDER — CEFAZOLIN SODIUM/WATER 2 G/20 ML
2 SYRINGE (ML) INTRAVENOUS
Status: COMPLETED | OUTPATIENT
Start: 2025-03-25 | End: 2025-03-25

## 2025-03-25 RX ORDER — FENTANYL CITRATE 50 UG/ML
INJECTION, SOLUTION INTRAMUSCULAR; INTRAVENOUS PRN
Status: DISCONTINUED | OUTPATIENT
Start: 2025-03-25 | End: 2025-03-25

## 2025-03-25 RX ORDER — DEXAMETHASONE SODIUM PHOSPHATE 4 MG/ML
4 INJECTION, SOLUTION INTRA-ARTICULAR; INTRALESIONAL; INTRAMUSCULAR; INTRAVENOUS; SOFT TISSUE
Status: COMPLETED | OUTPATIENT
Start: 2025-03-25 | End: 2025-03-25

## 2025-03-25 RX ADMIN — PROPOFOL 100 MCG/KG/MIN: 10 INJECTION, EMULSION INTRAVENOUS at 15:06

## 2025-03-25 RX ADMIN — PROCHLORPERAZINE EDISYLATE 5 MG: 5 INJECTION INTRAMUSCULAR; INTRAVENOUS at 18:33

## 2025-03-25 RX ADMIN — TRANEXAMIC ACID 1950 MG: 650 TABLET ORAL at 13:48

## 2025-03-25 RX ADMIN — FENTANYL CITRATE 25 MCG: 50 INJECTION, SOLUTION INTRAMUSCULAR; INTRAVENOUS at 14:40

## 2025-03-25 RX ADMIN — LABETALOL HYDROCHLORIDE 10 MG: 5 INJECTION, SOLUTION INTRAVENOUS at 17:44

## 2025-03-25 RX ADMIN — ROPIVACAINE HYDROCHLORIDE 4 ML: 5 INJECTION, SOLUTION EPIDURAL; INFILTRATION; PERINEURAL at 14:25

## 2025-03-25 RX ADMIN — ACETAMINOPHEN 975 MG: 325 TABLET ORAL at 06:20

## 2025-03-25 RX ADMIN — DEXMEDETOMIDINE HYDROCHLORIDE 12 MCG: 100 INJECTION, SOLUTION INTRAVENOUS at 14:40

## 2025-03-25 RX ADMIN — PHENYLEPHRINE HYDROCHLORIDE 150 MCG: 10 INJECTION INTRAVENOUS at 15:00

## 2025-03-25 RX ADMIN — Medication 100 MG: at 14:52

## 2025-03-25 RX ADMIN — Medication 2 G: at 14:41

## 2025-03-25 RX ADMIN — FENTANYL CITRATE 25 MCG: 50 INJECTION, SOLUTION INTRAMUSCULAR; INTRAVENOUS at 14:52

## 2025-03-25 RX ADMIN — PHENYLEPHRINE HYDROCHLORIDE 100 MCG: 10 INJECTION INTRAVENOUS at 15:40

## 2025-03-25 RX ADMIN — ONDANSETRON 4 MG: 2 INJECTION INTRAMUSCULAR; INTRAVENOUS at 14:43

## 2025-03-25 RX ADMIN — PHENYLEPHRINE HYDROCHLORIDE 100 MCG: 10 INJECTION INTRAVENOUS at 15:34

## 2025-03-25 RX ADMIN — SODIUM CHLORIDE, SODIUM LACTATE, POTASSIUM CHLORIDE, AND CALCIUM CHLORIDE: .6; .31; .03; .02 INJECTION, SOLUTION INTRAVENOUS at 13:45

## 2025-03-25 RX ADMIN — PHENYLEPHRINE HYDROCHLORIDE 100 MCG: 10 INJECTION INTRAVENOUS at 15:24

## 2025-03-25 RX ADMIN — LIDOCAINE HYDROCHLORIDE 5 ML: 10 INJECTION, SOLUTION INFILTRATION; PERINEURAL at 14:52

## 2025-03-25 RX ADMIN — ONDANSETRON 4 MG: 2 INJECTION, SOLUTION INTRAMUSCULAR; INTRAVENOUS at 16:26

## 2025-03-25 RX ADMIN — DEXAMETHASONE SODIUM PHOSPHATE 4 MG: 4 INJECTION, SOLUTION INTRA-ARTICULAR; INTRALESIONAL; INTRAMUSCULAR; INTRAVENOUS; SOFT TISSUE at 16:45

## 2025-03-25 RX ADMIN — SODIUM CHLORIDE, SODIUM LACTATE, POTASSIUM CHLORIDE, AND CALCIUM CHLORIDE: .6; .31; .03; .02 INJECTION, SOLUTION INTRAVENOUS at 16:39

## 2025-03-25 RX ADMIN — ROPIVACAINE HYDROCHLORIDE 20 ML: 5 INJECTION, SOLUTION EPIDURAL; INFILTRATION; PERINEURAL at 14:20

## 2025-03-25 RX ADMIN — FENTANYL CITRATE 50 MCG: 50 INJECTION, SOLUTION INTRAMUSCULAR; INTRAVENOUS at 17:00

## 2025-03-25 RX ADMIN — PROPOFOL 160 MG: 10 INJECTION, EMULSION INTRAVENOUS at 14:52

## 2025-03-25 RX ADMIN — ONDANSETRON 4 MG: 2 INJECTION, SOLUTION INTRAMUSCULAR; INTRAVENOUS at 21:02

## 2025-03-25 RX ADMIN — INSULIN GLARGINE 8 UNITS: 100 INJECTION, SOLUTION SUBCUTANEOUS at 22:27

## 2025-03-25 RX ADMIN — FENTANYL CITRATE 50 MCG: 50 INJECTION, SOLUTION INTRAMUSCULAR; INTRAVENOUS at 16:11

## 2025-03-25 ASSESSMENT — ACTIVITIES OF DAILY LIVING (ADL)
ADLS_ACUITY_SCORE: 50
ADLS_ACUITY_SCORE: 49
ADLS_ACUITY_SCORE: 51
ADLS_ACUITY_SCORE: 49
ADLS_ACUITY_SCORE: 51
ADLS_ACUITY_SCORE: 49
ADLS_ACUITY_SCORE: 49
ADLS_ACUITY_SCORE: 50
ADLS_ACUITY_SCORE: 49
ADLS_ACUITY_SCORE: 51
ADLS_ACUITY_SCORE: 51
ADLS_ACUITY_SCORE: 49
ADLS_ACUITY_SCORE: 50
DEPENDENT_IADLS:: INDEPENDENT
ADLS_ACUITY_SCORE: 34
ADLS_ACUITY_SCORE: 50
ADLS_ACUITY_SCORE: 49
ADLS_ACUITY_SCORE: 50
ADLS_ACUITY_SCORE: 49

## 2025-03-25 NOTE — TREATMENT PLAN
I have not personally evaluated this patient.    64yo F with left hip pain after GLF. Able to walk some after injury. Imaging with left proximal femur GT fracture with IT extension on MRI    A/p:  Hospitalist primary, ortho following  -may choose nonop management with protected WB and surveillance. Op management would include internal fixation and immediate WB.  -NPO midnight  -preop labs  -hold anticoags  -medical optimization per primary. Recommended to hold off on surgical intervention this evening due to uncontrolled blood sugars.  -possible OR tomorrow with Dr. Aparicio for IMN ~215pm    Titus Smith MD  [facility]

## 2025-03-25 NOTE — OP NOTE
PATIENT: Ze Pabon  MR# :   [id number]  DATE OF OPERATION: 03/25/25    SURGEON:  Pierre Aparicio MD     ASSISTANT: MARTHA David     A skilled assistant was required due to the nature of the case for patient position, retraction, exposure, implant placement, closure and dressing application.      Preoperative diagnosis:   Left hip intertrochanteric femur fracture     Postoperative diagnosis:   Left hip intertrochanteric femur fracture     Surgical procedure:   Left hip cephalomedullary nail    Anesthesia:  MAC with regional block    Drains: None    Estimated blood loss: 200 cc    IV fluids: Please see Anesthesia Report    Complications: None identified intraoperatively     Blood products: none given     Specimens: * No specimens in log *    Findings: Non displaced intertrochanteric femur fracture     COMPONENTS IMPLANTED:  Implant Name Type Inv. Item Serial No.  Lot No. LRB No. Used Action   IMP NAIL SYN CAN FEM PROX TFNA [id number] 130D 04.037.042S - [id number] [employer]/[place] IMP NAIL SYN CAN FEM PROX TFNA [id number] 130D 04.037.042S  [place] [id number] Left 1 Implanted   IMP SCR SYN TFNA FENESTRATED LAG 90MM 04.038.190S - [id number] Met[employer]/[place] IMP SCR SYN TFNA FENESTRATED LAG 90MM 04.038.190S  [employer] [id number] Left 1 Implanted   SCREW BN 36MM 5MM LCK X25 STRL LF IM NL 04.045.036TS - [id number] Metallic Hardware/[place] SCREW BN 36MM 5MM LCK X25 STRL LF IM NL 04.045.036TS  [employer] [id number] Left 1 Implanted         INDICATIONS:    Ze Pabon is a 65 year old female admitted for a left hip fracture.  She sustained a ground-level fall at home 4 days prior.  She had pain and difficulty weightbearing.  She presented to the emergency department where MRI showed a nondisplaced intertrochanteric femur fracture.  Following discussion with her and her son via a [facility] [place] we agreed to proceed with surgery to allow for stabilization of the fracture as well as  immediate full weightbearing.  We did discuss nonsurgical treatment in the form of protected weightbearing with a walker however we both agreed that it would be in her best interest to move forward with surgery to allow immediate weightbearing    Preoperatively, the nature of the procedure, risks and benefits, as well as alternatives including nonsurgical management were discussed in detail with the patient. I reviewed and discussed the patient's condition and relevant images with the patient. We discussed options for further evaluation and treatment, including conservative non-operative management versus surgical intervention.      We also discussed at length the risks and benefits of surgery. Our discussion included but was not limited to the risk of pain, bleeding, infection, blood clots (DVT, PE), wound issues, continued cpain, post-operative joint stiffness, painful arc of motion, difficulty with ambulation, damage to nearby neurovascular structures, and need for further surgeries. The possibility of intra-operative and/or post-operative medical complications such as anesthesia complications or reactions, respiratory and cardiovascular events, stroke, heart attack and/or death were also discussed.     Specific details of the surgical procedure, hospitalization, recovery, rehabilitation, and long-term precautions were also presented. The final choices will be made at the time of procedure to match the anatomy and condition of the bone, ligaments, tendons, and muscles. All of patients questions were answered preoperatively at which time informed consent was taken.      PROCEDURE:    After proper identification of the patient including verification and marking of the surgical site, the patient was brought to the operating room. MAC anesthesia was given without complications and prophylactic IV Ancef was confirmed to have been administered within the appropriate timeframe(s). The patient was placed in the supine  position with All bony prominences were well padded.  No reduction maneuver was performed as the fracture was nondisplaced.  X-rays confirmed appropriate maintenance of fracture alignment.  The surgical site which was properly marked, was then prepped and draped in the usual sterile fashion. A timeout was done utilizing protocol to again identify the correct patient and operative site, which was marked appropriately.    I began by making a small nick incision just proximal to the tip of the greater trochanter.  The wire was advanced just medial to the tip of the greater trochanter and then down the femoral canal, confirming appropriate position on the AP and lateral x-rays.  The incision was elongated both proximally and distally.  An opening reamer was then used to open the proximal femur.  I then proceeded to place a 10 mm x 170 mm short nail.  This was advanced to the appropriate depth.  An incision was then made for the lateral cephalomedullary screw guide.  This was advanced down to the lateral cortex.  The guidewire was then advanced along the inferior aspect of the femoral neck and into the femoral head trying to minimize tip apex distance.  I proceeded to measure and drill and then placed a 90 mm cephalomedullary screw again minimizing tip apex distance on the AP and lateral x-rays.  The setscrew was then placed.  I then proceeded to place the distal interlocking screws through the same incision.  Final AP and lateral x-rays were taken and the jig was removed.  The wounds were copiously irrigated and closed with 0 Vicryl, 2-0 Vicryl and 3-0 Monocryl suture.    The patient was then awakened from anesthesia and transferred the PACU in stable condition.    Sponge, needle, and instrument counts were correct at the conclusion of the procedure. The patient has palpable distal pulses in both lower extremity.     Postoperative Plan:  Pain Control: Continue per pain protocol.  Weight Bearing: Weightbearing as  tolerated left lower extremity  DVT Prophylaxis: ASA 81 mg BID  Antibiotics: 24 hours of perioperative antibiotics  GI: Plan for aggressive bowel regimen to prevent constipation from narcotic medications  Lines: HLIV once tolerating PO  PT/OT: Eval and treatment. Will follow up on recommendations.  Follow up:  in 2 weeks in clinic for wound check  Discharge plan: to home versus TCU pending progress with PT.    Dispo: stable to pacu    Pierre Aparicio MD  Orthopedic Surgery  [facility]

## 2025-03-25 NOTE — H&P (VIEW-ONLY)
ORTHOPEDIC CONSULTATION    Consultation  Ze Abreu,  1960, MRN [id number]    Hip pain, left [M25.552]  Fall, initial encounter [W19.XXXA]  Closed nondisplaced intertrochanteric fracture of left femur, initial encounter (H) [S72.145A]  Hip fracture, left, closed, initial encounter (H) [S72.002A]  Uncontrolled type 2 diabetes mellitus with hyperglycemia (H) [E11.65]    PCP: No Ref-Primary, Physician, None   Code status:  Full Code       Extended Emergency Contact Information  Primary Emergency Contact: SHAMEKA ABREU  Mobile Phone: [phone]  Relation: Daughter  Secondary Emergency Contact: VICTOR HUGO ABREU  Mobile Phone: [phone]  Relation: Spouse         IMPRESSION:  65-year-old female with mildly displaced left greater trochanteric fracture with nondisplaced intertrochanteric extension     PLAN:  This patient was discussed with Dr. Smith, on-call surgeon for [facility] and they are in agreement with the following plan.   - Discussed treatment options including surgical intervention with Dr. Aparicio. Patient would likely benefit from a hip nail. Discussed risks of procedure including but not limited to, injury to nerves, arteries or veins, malunion, nonunion, periprosthetic fracture, DVT or even death with the patient and they are in agreement with proceeding.  - Will take to the OR today.   - Type & Screen   - Anesthesia to place block for improved pain control  - Medical optimization per Hospitalist. Hgb 9.9. INR 1.07.  - Pain control. Currently well controlled on IV dilaudid.   - NWB right lower extremity.    Thank you for including [facility] in the care of Ze Abreu. It has been a pleasure participating in their care.      CHIEF COMPLAINT: Right hip fracture      HISTORY OF PRESENT ILLNESS:  The patient is seen in orthopedic consultation at the request of Dr. Rosenstein, Benjamin, MD.  The patient is a 65 year old female with c/o right hip pain.  They presented to the ED with right hip pain  following a fall.  She notes that she sustained a fall about 4 to 5 days ago in which she fell and landed directly onto her left hip.  She has been having severe pain since that time.  She has been weightbearing and ambulating on the left hip but pain is very severe when doing so which prompted her to come into the emergency department yesterday for further evaluation.  She has been using a cane since the fall however baseline prior to the injury she was weightbearing and ambulating independently without assistance.  When she admitted to the emergency department CT scan demonstrated a greater trochanter fracture, MRI demonstrated nondisplaced intertrochanteric extension.  During the time my visit she is comfortable at rest.  Denies any numbness/tingling in her left lower extremity.    PAST MEDICAL HISTORY:   History reviewed. No pertinent past medical history.    ALLERGIES:   Patient has no known allergies.    MEDICATIONS ON ADMISSION:  Medications were reviewed.  They do include:   Medications Prior to Admission   Medication Sig Dispense Refill Last Dose/Taking    acetaminophen (TYLENOL) 500 MG tablet Take 500-1,000 mg by mouth every 6 hours as needed for mild pain.   Taking As Needed    ibuprofen (ADVIL/MOTRIN) 200 MG tablet Take 200 mg by mouth every 4 hours as needed for pain.   Taking As Needed       SOCIAL HISTORY:         FAMILY HISTORY:  History reviewed. No pertinent family history.    REVIEW OF SYSTEMS:   See Admission History and Physical     Clinically Significant Risk Factors Present on Admission                    PHYSICAL EXAMINATION:  General: On examination, the patient is resting comfortably, NAD and awake, lying supine in bed and oriented to person, place and time   SKIN: Ecchymosis noted over left hip, skin intact  Pulses:  Palpable bilateral dorsalis pedis pulses.  Sensation: intact and equal bilaterally to the distal lower extremities, feet and toes.  Tenderness: Left hip tenderness noted. No  tenderness noted in calfs bilaterally  ROM: Equal ankle plantar and dorsiflexion, moves all toes bilaterally. Unable to lift leg off bed due to pain  Motor:  +5/5 ankle DF, PF, EHL bilaterally.  Left hip and knee not assessed due to fracture. Contralateral hip flexion and knee extension +5/5.   Extremity: Left lower extremity externally rotated slightly, no shortening    Temp:  [97.9  F (36.6  C)-98.4  F (36.9  C)] 97.9  F (36.6  C)  Pulse:  [] 88  Resp:  [12-20] 20  BP: (147-180)/(70-80) 180/78  SpO2:  [97 %-100 %] 99 %    RADIOGRAPHIC EVALUATION:  Radiographs personally reviewed.  EXAM: MR HIP LEFT W/O CONTRAST  LOCATION: [facility]  DATE: 3/24/2025     INDICATION: further evaluate fracture  COMPARISON: CT 03/24/2025  TECHNIQUE: Unenhanced.     FINDINGS:     LEFT HIP:   -Labrum: Degeneration of the anterosuperior labrum.   -Cartilage: Likely grade II chondromalacia of the left hip. No subchondral edema.  -Joint space: Small joint effusion.   -Joint capsule/ligaments: Intact joint capsule. Ligamentum teres is attenuated.     MUSCLES AND TENDONS:   -Gluteal: Gluteus minimus tendon is intact. The greater trochanter anterior facet is nondisplaced. There is likely reactive edema within the tendon. No significant muscular atrophy. There is mild displacement of the lateral and superior posterior facets   of the greater trochanter with associated small tears of the gluteus medius tendon. No focal muscular atrophy.  -Proximal hamstring: Mild proximal hamstring tendinopathy. No significant tear.   -Iliopsoas: No tendon tear or tendinopathy. No significant bursitis.  -Rectus femoris origin: No tear or tendinopathy.     BONES:   -There is a fracture of the proximal femur involving the greater trochanter which is mildly displaced superiorly. There is nondisplaced extension into the intertrochanteric femur (series 3 and 4 image 9 or series 5 and 6 image 12). The lesser trochanter   is spared.  "  -Mild left sacroiliac joint arthrosis. There is likely reactive edema in the left sacral ala.     SOFT TISSUES:   -Marked edema about the proximal femoral fracture with nonorganized fluid/blood products extending into the adjacent musculature.     INTRA-PELVIC CONTENTS:  -Visualized portions are normal.                                                                      IMPRESSION:  1.  Proximal left femoral fracture with mildly displaced involvement of the greater trochanter and nondisplaced extension into the intertrochanteric femur. The lesser trochanter spared.  2.  Mild underlying left hip osteoarthritis with a small joint effusion.    LABORATORY DATA:     Lab Results   Component Value Date    INR 1.07 03/25/2025       Lab Results   Component Value Date    WBC 6.5 03/25/2025     Lab Results   Component Value Date    RBC 3.19 03/25/2025     Lab Results   Component Value Date    HGB 9.9 03/25/2025     Lab Results   Component Value Date    HCT 27.1 03/25/2025     No components found for: \"MCT\"  Lab Results   Component Value Date    MCV 85 03/25/2025     Lab Results   Component Value Date    MCH 31.0 03/25/2025     Lab Results   Component Value Date    MCHC 36.5 03/25/2025     Lab Results   Component Value Date    RDW 11.5 03/25/2025     Lab Results   Component Value Date     03/25/2025         Deonte Harris PA-C/Dr. Aparicio  [facility]        "

## 2025-03-25 NOTE — ANESTHESIA PROCEDURE NOTES
Airway       Patient location during procedure: OR       Procedure Start/Stop Times: 3/25/2025 2:53 PM  Staff -        CRNA: Do Hahn APRN CRNA       Performed By: CRNA  Consent for Airway        Urgency: elective  Indications and Patient Condition       Indications for airway management: keke-procedural       Induction type:RSI       Mask difficulty assessment: 0 - not attempted    Final Airway Details       Final airway type: endotracheal airway       Successful airway: ETT - single and Oral  Endotracheal Airway Details        ETT size (mm): 7.0       Cuffed: yes       Cuff volume (mL): 5       Successful intubation technique: direct laryngoscopy       DL Blade Type: Duff 2       Grade View of Cords: 2 (skewed to the right probably due to CP)       Adjucts: stylet       Position: Right       Measured from: lips       Secured at (cm): 20       Bite block used: None    Post intubation assessment        Placement verified by: capnometry, equal breath sounds and chest rise        Number of attempts at approach: 1       Number of other approaches attempted: 0       Secured with: tape       Ease of procedure: easy       Dentition: Intact and Unchanged    Medication(s) Administered   Medication Administration Time: 3/25/2025 2:53 PM

## 2025-03-25 NOTE — ANESTHESIA CARE TRANSFER NOTE
Patient: Ze Pabon    Procedure: Procedure(s):  OPEN REDUCTION INTERNAL FIXATION, FRACTURE, FEMUR, PROXIMAL       Diagnosis: Closed nondisplaced intertrochanteric fracture of left femur, initial encounter (H) [P62.719X]  Diagnosis Additional Information: No value filed.    Anesthesia Type:   MAC     Note:    Oropharynx: oropharynx clear of all foreign objects and spontaneously breathing  Level of Consciousness: drowsy  Oxygen Supplementation: face mask  Level of Supplemental Oxygen (L/min / FiO2): 8  Independent Airway: airway patency satisfactory and stable  Dentition: dentition unchanged  Vital Signs Stable: post-procedure vital signs reviewed and stable  Report to RN Given: handoff report given  Patient transferred to: PACU    Handoff Report: Identifed the Patient, Identified the Reponsible Provider, Reviewed the pertinent medical history, Discussed the surgical course, Reviewed Intra-OP anesthesia mangement and issues during anesthesia, Set expectations for post-procedure period and Allowed opportunity for questions and acknowledgement of understanding      Vitals:  Vitals Value Taken Time   /62 03/25/25 1553   Temp     Pulse 79 03/25/25 1556   Resp 23 03/25/25 1556   SpO2 100 % 03/25/25 1556   Vitals shown include unfiled device data.    Electronically Signed By: KENYATTA Perez CRNA  March 25, 2025  3:58 PM

## 2025-03-25 NOTE — PROGRESS NOTES
[facility]    Progress Note - Hospitalist Service       Date of Admission:  3/24/2025    Assessment & Plan   Ze Pabon is a 65 year old female admitted on 3/24/2025. She has a history of T2DM, HTN, HLD and is admitted for left hip fx 2/2 mechanical fall and hyperglycemia in the setting of uncontrolled diabetes. She requires orthopedic consultation, pain control, and management of hyperglycemia.     High risk for DVT given fx and recent travel. Will need anticoagulation post operatively      Left hip fx  Mechanical Fall   Patient reported to the ED with left hip pain and difficulty ambulating following a mechanical fall on 3/18. Found to have proximal left femoral fracture with mildly displaced involvement of the greater trochanter and nondisplaced extension into the intertrochanteric femur. ED provider discussed with Dr. Smith [facility]. He recommended either surgery vs. At least 6 weeks nonweightbearing. Patient leaning towards surgical option but would like to continue to discuss with family. CT Head & C-spine ordered due to mechanism of fall, no acute findings.   - Orthopedic surgery consult. On OR schedule for 3/25 afternoon  - NPO at midnight  - Pain control: PRN tylenol, oxycodone   - PT/CM consult  - EKG, INR, Type & Screen ordered for preoperative evaluation.     Osteoporosis  Hx fragility fracture  BMD can be done outpatient.  Will add on labs to assess for other modifiable causes.  CKD may be a risk factor for her.  - CMP  - Vit D  - PTH    T2DM  Hyperglycemia    A1c 12.8. Presented with . Patient reports poor compliance with diabetes medications and hasn't taken anything in almost a year. Has not followed up with provider since 2022. Total daily insulin requirement per weight is 21.6 units. Will require close monitoring of blood sugars and tight control to optimize healing post operatively.   - 8 units Lantus at bedtime  - MDSSI   - q4hr BG checks   - will likely  require meal time insulin once eating after surgery     CKD vs. RACHEL  Cr 1.46 on admission. 1.52 in 2022. Likely chronic kidney disease secondary to uncontrolled diabetes. Was previously on lisinopril for BP control & kidney protection. Could consider restarting upon discharge. S/p 1L NS bolus in ED.   - daily BMP   - urine culture pending         Diet: NPO for Procedure/Surgery per Anesthesia Guidelines Except for: Meds; Clear liquids before procedure/surgery: ADULT (Age GREATER than or Equal to 18 years) - Clear liquids 2 hours before procedure/surgery    DVT Prophylaxis: Pneumatic Compression Devices. High risk for DVT given fx and recent travel. Will need anticoagulation post operatively   Witt Catheter: Not present  Fluids: NPO, s/p bolus  Lines: None     Cardiac Monitoring: None  Code Status: Full Code      Clinically Significant Risk Factors Present on Admission         # Hyponatremia: Lowest Na = 130 mmol/L in last 2 days, will monitor as appropriate                # Anemia: based on hgb <11      # DMII: A1C = 12.8 % (Ref range: <5.7 %) within past 6 months               Social Drivers of Health   Depression: At risk (3/30/2022)    Received from [employer]    PHQ-2     PHQ-2 Score: 4         Disposition Plan     Medically Ready for Discharge: Anticipated in 2-4 Days         The patient's care was discussed with the Attending Physician, Dr. Renee .    Michael Khan MD  Hospitalist Service  [facility]  Securely message with [employer] (more info)  Text page via [employer] Paging/Directory   ______________________________________________________________________    Interval History   DREAD  Pain controlled    Physical Exam   Vital Signs: Temp: 97.9  F (36.6  C) Temp src: Oral BP: (!) 180/78 Pulse: 88   Resp: 20 SpO2: 99 % O2 Device: None (Room air)    Weight: 119 lbs 7.83 oz    GENERAL: healthy, alert and no distress  RESP: lungs clear, speaking in full sentences, normal work of  breathing   CV: RRR, extremities well perfused  ABDOMEN: soft, nontender  MS: no gross musculoskeletal defects noted, no edema  PSYCH: mentation appears normal, affect normal/bright         Data     I have personally reviewed the following data over the past 24 hrs:    6.5  \   9.9 (L)   / 153     138 107 28.7 (H) /  156 (H)   3.8 20 (L) 1.30 (H) \     ALT: 14 AST: 13 AP: 132 TBILI: 0.4   ALB: 3.7 TOT PROTEIN: 6.8 LIPASE: N/A     TSH: N/A T4: N/A A1C: 12.8 (H)     INR:  1.07 PTT:  N/A   D-dimer:  N/A Fibrinogen:  N/A       Imaging results reviewed over the past 24 hrs:   Recent Results (from the past 24 hours)   Head CT w/o contrast    Narrative    EXAM: CT HEAD W/O CONTRAST, CT CERVICAL SPINE W/O CONTRAST  LOCATION: [facility]  DATE: 3/24/2025    INDICATION: Fall  COMPARISON: None   TECHNIQUE:   1) Routine CT Head without IV contrast. Multiplanar reformats. Dose reduction techniques were used.  2) Routine CT Cervical Spine without IV contrast. Multiplanar reformats. Dose reduction techniques were used.    FINDINGS:   HEAD CT:   INTRACRANIAL CONTENTS: No acute intracranial hemorrhage, extraaxial fluid collection, or mass effect. No evidence of an acute transcortical confluent infarct. Mild presumed chronic small vessel ischemic changes. Mild-moderate generalized cerebral and   mild cerebellar parenchymal volume loss. No hydrocephalus.    VISUALIZED ORBITS/SINUSES/MASTOIDS: No acute intraorbital finding. No significant paranasal sinus or mastoid mucosal disease.    BONES/SOFT TISSUES: No acute calvarial injury or significant scalp hematoma.    CERVICAL SPINE CT:   VERTEBRA: Diffuse osseous demineralization. No acute displaced fracture, traumatic listhesis, or compression deformity. Straightening of the normal cervical lordosis with 1-2 mm likely degenerative retrolisthesis at C4-C5. Subchondral cystic change   involving the inferior C4 and superior C5 endplates, greater at the former where  there is vacuum phenomenon. Vacuum phenomena are also seen in the ventral epidural space at C3 inferior endplate level and C6 vertebral level. Moderate intervertebral disc   height loss at C4-C5, mild-moderate at C5-C6, and mild at C3-C4. Mild scattered facet arthrosis. Curvilinear periodontal ligamentous calcifications, likely CPPD related.    CANAL/FORAMINA: Posterior disc-osteophyte complexes most notably at C4-C5 where a prominent central/left central disc-osteophyte complex contributes to mild spinal canal stenosis. Multilevel foraminal narrowing, worst and moderate at C4-C5 bilaterally.    EXTRASPINAL: No prevertebral edema. Clear visualized lungs.      Impression    IMPRESSION:  HEAD CT:  1.  No acute intracranial abnormality.  2.  Chronic aged-related intracranial changes.    CERVICAL SPINE CT:  1.  No CT evidence for acute fracture or post traumatic subluxation.  2.  Diffuse osseous demineralization and multilevel spondylosis, as described.   CT Cervical Spine w/o Contrast    Narrative    EXAM: CT HEAD W/O CONTRAST, CT CERVICAL SPINE W/O CONTRAST  LOCATION: [facility]  DATE: 3/24/2025    INDICATION: Fall  COMPARISON: None   TECHNIQUE:   1) Routine CT Head without IV contrast. Multiplanar reformats. Dose reduction techniques were used.  2) Routine CT Cervical Spine without IV contrast. Multiplanar reformats. Dose reduction techniques were used.    FINDINGS:   HEAD CT:   INTRACRANIAL CONTENTS: No acute intracranial hemorrhage, extraaxial fluid collection, or mass effect. No evidence of an acute transcortical confluent infarct. Mild presumed chronic small vessel ischemic changes. Mild-moderate generalized cerebral and   mild cerebellar parenchymal volume loss. No hydrocephalus.    VISUALIZED ORBITS/SINUSES/MASTOIDS: No acute intraorbital finding. No significant paranasal sinus or mastoid mucosal disease.    BONES/SOFT TISSUES: No acute calvarial injury or significant scalp  hematoma.    CERVICAL SPINE CT:   VERTEBRA: Diffuse osseous demineralization. No acute displaced fracture, traumatic listhesis, or compression deformity. Straightening of the normal cervical lordosis with 1-2 mm likely degenerative retrolisthesis at C4-C5. Subchondral cystic change   involving the inferior C4 and superior C5 endplates, greater at the former where there is vacuum phenomenon. Vacuum phenomena are also seen in the ventral epidural space at C3 inferior endplate level and C6 vertebral level. Moderate intervertebral disc   height loss at C4-C5, mild-moderate at C5-C6, and mild at C3-C4. Mild scattered facet arthrosis. Curvilinear periodontal ligamentous calcifications, likely CPPD related.    CANAL/FORAMINA: Posterior disc-osteophyte complexes most notably at C4-C5 where a prominent central/left central disc-osteophyte complex contributes to mild spinal canal stenosis. Multilevel foraminal narrowing, worst and moderate at C4-C5 bilaterally.    EXTRASPINAL: No prevertebral edema. Clear visualized lungs.      Impression    IMPRESSION:  HEAD CT:  1.  No acute intracranial abnormality.  2.  Chronic aged-related intracranial changes.    CERVICAL SPINE CT:  1.  No CT evidence for acute fracture or post traumatic subluxation.  2.  Diffuse osseous demineralization and multilevel spondylosis, as described.   CT Hip Left w/o Contrast    Narrative    EXAM: CT HIP LEFT W/O CONTRAST  LOCATION: [facility]  DATE: 3/24/2025    INDICATION: Fall, pain.   COMPARISON: None available.   TECHNIQUE: Noncontrast. Axial, sagittal and coronal thin-section reconstruction. Dose reduction techniques were used.     FINDINGS:     BONES:  -Diffuse osseous demineralization. Mildly displaced fracture through the base of the greater trochanter. No definite intertrochanteric extension of the fracture, however, there is increased density of the intramedullary canal within the intertrochanteric   region which may  reflect edema and possibly an occult fracture. Mild degenerative arthrosis of the left hip and the pubic symphysis.     SOFT TISSUES:  -Soft tissue edema involving the lateral proximal thigh.  -Scattered arterial vascular calcifications.       Impression    IMPRESSION:  1.  Mildly displaced fracture through the base of the greater trochanter. No definite intertrochanteric extension, however, there is increased density of the intramedullary canal in the intertrochanteric region which may reflect edema and possibly an   occult fracture. MRI would be more sensitive for evaluation if clinically indicated.  2.  Mild degenerative arthrosis of the left hip.    NOTE: ABNORMAL REPORT    THE DICTATION ABOVE DESCRIBES AN ABNORMALITY FOR WHICH FOLLOW-UP IS NEEDED.       Chest XR,  PA & LAT    Narrative    EXAM: XR CHEST 2 VIEWS  LOCATION: [facility]  DATE: 3/24/2025    INDICATION: left rib pain after fall  COMPARISON: 12/09/2020      Impression    IMPRESSION: Stable chest with no acute cardiopulmonary or musculoskeletal abnormalities. Mildly calcified thoracic aortic arch. Slight thoracolumbar spinal curvature.   MR Hip Left w/o Contrast    Narrative    EXAM: MR HIP LEFT W/O CONTRAST  LOCATION: [facility]  DATE: 3/24/2025    INDICATION: further evaluate fracture  COMPARISON: CT 03/24/2025  TECHNIQUE: Unenhanced.    FINDINGS:    LEFT HIP:   -Labrum: Degeneration of the anterosuperior labrum.   -Cartilage: Likely grade II chondromalacia of the left hip. No subchondral edema.  -Joint space: Small joint effusion.   -Joint capsule/ligaments: Intact joint capsule. Ligamentum teres is attenuated.    MUSCLES AND TENDONS:   -Gluteal: Gluteus minimus tendon is intact. The greater trochanter anterior facet is nondisplaced. There is likely reactive edema within the tendon. No significant muscular atrophy. There is mild displacement of the lateral and superior posterior facets   of the  greater trochanter with associated small tears of the gluteus medius tendon. No focal muscular atrophy.  -Proximal hamstring: Mild proximal hamstring tendinopathy. No significant tear.   -Iliopsoas: No tendon tear or tendinopathy. No significant bursitis.  -Rectus femoris origin: No tear or tendinopathy.    BONES:   -There is a fracture of the proximal femur involving the greater trochanter which is mildly displaced superiorly. There is nondisplaced extension into the intertrochanteric femur (series 3 and 4 image 9 or series 5 and 6 image 12). The lesser trochanter   is spared.   -Mild left sacroiliac joint arthrosis. There is likely reactive edema in the left sacral ala.    SOFT TISSUES:   -Marked edema about the proximal femoral fracture with nonorganized fluid/blood products extending into the adjacent musculature.    INTRA-PELVIC CONTENTS:  -Visualized portions are normal.      Impression    IMPRESSION:  1.  Proximal left femoral fracture with mildly displaced involvement of the greater trochanter and nondisplaced extension into the intertrochanteric femur. The lesser trochanter spared.  2.  Mild underlying left hip osteoarthritis with a small joint effusion.

## 2025-03-25 NOTE — INTERVAL H&P NOTE
I have reviewed the surgical (or preoperative) H&P that is linked to this encounter, and examined the patient. There are no significant changes    Clinical Conditions Present on Arrival:  Clinically Significant Risk Factors Present on Admission         # Hyponatremia: Lowest Na = 130 mmol/L in last 2 days, will monitor as appropriate               # DMII: A1C = 12.8 % (Ref range: <5.7 %) within past 6 months     65-year-old female who sustained a ground-level fall 5 days ago.  She landed on her left hip and has continued to have pain since then.  Upon presentation to the emergency department, she was found to have a greater trochanter fracture.  MRI showed intertrochanteric extension.  She has had difficulty weightbearing with a cane.    General: Alert female no acute distress  Left lower extremity: Minimal swelling throughout the left hip.  There is tenderness palpation over the lateral hip.  Pain with any hip range of motion.  Foot is warm and well-perfused.    MRI left hip shows evidence of a fracture through greater trochanter of the left hip with intertrochanteric extension.    65-year-old female with left nondisplaced intertrochanteric femur fracture    I had a long discussion with the patient and her son with the assistance of a Mobile System 7 .  I discussed the nature of her injury.  We did discuss the possibility of nonoperative treatment with protected weightbearing for at least 6 weeks.  Given the evidence of intertrochanteric extension however, I did recommend surgical intervention in the form of a left hip cephalomedullary nail to provide stabilization of the fracture and allow for immediate weightbearing.  We did discuss the risk of bleeding, infection, damage to nerves and blood vessels, painful hardware and the need for further surgeries.  We also discussed risk of anesthetic complications could heart attack, stroke, blood clot and even death.  Following discussion, all of her questions were  addressed.  Will plan to move forward with surgery in the form of a left hip cephalomedullary nail.    Pierre Aparicio MD  03/25/25 [facility]

## 2025-03-25 NOTE — CONSULTS
ORTHOPEDIC CONSULTATION    Consultation  Ze Abreu,  1960, MRN [id number]    Hip pain, left [M25.552]  Fall, initial encounter [W19.XXXA]  Closed nondisplaced intertrochanteric fracture of left femur, initial encounter (H) [S72.145A]  Hip fracture, left, closed, initial encounter (H) [S72.002A]  Uncontrolled type 2 diabetes mellitus with hyperglycemia (H) [E11.65]    PCP: No Ref-Primary, Physician, None   Code status:  Full Code       Extended Emergency Contact Information  Primary Emergency Contact: SHAMEKA ABREU  Mobile Phone: [phone]  Relation: Daughter  Secondary Emergency Contact: VICTOR HUGO ABREU  Mobile Phone: [phone]  Relation: Spouse         IMPRESSION:  65-year-old female with mildly displaced left greater trochanteric fracture with nondisplaced intertrochanteric extension     PLAN:  This patient was discussed with Dr. Smith, on-call surgeon for [facility] and they are in agreement with the following plan.   - Discussed treatment options including surgical intervention with Dr. Aparicio. Patient would likely benefit from a hip nail. Discussed risks of procedure including but not limited to, injury to nerves, arteries or veins, malunion, nonunion, periprosthetic fracture, DVT or even death with the patient and they are in agreement with proceeding.  - Will take to the OR today.   - Type & Screen   - Anesthesia to place block for improved pain control  - Medical optimization per Hospitalist. Hgb 9.9. INR 1.07.  - Pain control. Currently well controlled on IV dilaudid.   - NWB right lower extremity.    Thank you for including [facility] in the care of Ze Abreu. It has been a pleasure participating in their care.      CHIEF COMPLAINT: Right hip fracture      HISTORY OF PRESENT ILLNESS:  The patient is seen in orthopedic consultation at the request of Dr. Rosenstein, Benjamin, MD.  The patient is a 65 year old female with c/o right hip pain.  They presented to the ED with right hip pain  following a fall.  She notes that she sustained a fall about 4 to 5 days ago in which she fell and landed directly onto her left hip.  She has been having severe pain since that time.  She has been weightbearing and ambulating on the left hip but pain is very severe when doing so which prompted her to come into the emergency department yesterday for further evaluation.  She has been using a cane since the fall however baseline prior to the injury she was weightbearing and ambulating independently without assistance.  When she admitted to the emergency department CT scan demonstrated a greater trochanter fracture, MRI demonstrated nondisplaced intertrochanteric extension.  During the time my visit she is comfortable at rest.  Denies any numbness/tingling in her left lower extremity.    PAST MEDICAL HISTORY:   History reviewed. No pertinent past medical history.    ALLERGIES:   Patient has no known allergies.    MEDICATIONS ON ADMISSION:  Medications were reviewed.  They do include:   Medications Prior to Admission   Medication Sig Dispense Refill Last Dose/Taking    acetaminophen (TYLENOL) 500 MG tablet Take 500-1,000 mg by mouth every 6 hours as needed for mild pain.   Taking As Needed    ibuprofen (ADVIL/MOTRIN) 200 MG tablet Take 200 mg by mouth every 4 hours as needed for pain.   Taking As Needed       SOCIAL HISTORY:         FAMILY HISTORY:  History reviewed. No pertinent family history.    REVIEW OF SYSTEMS:   See Admission History and Physical     Clinically Significant Risk Factors Present on Admission                    PHYSICAL EXAMINATION:  General: On examination, the patient is resting comfortably, NAD and awake, lying supine in bed and oriented to person, place and time   SKIN: Ecchymosis noted over left hip, skin intact  Pulses:  Palpable bilateral dorsalis pedis pulses.  Sensation: intact and equal bilaterally to the distal lower extremities, feet and toes.  Tenderness: Left hip tenderness noted. No  tenderness noted in calfs bilaterally  ROM: Equal ankle plantar and dorsiflexion, moves all toes bilaterally. Unable to lift leg off bed due to pain  Motor:  +5/5 ankle DF, PF, EHL bilaterally.  Left hip and knee not assessed due to fracture. Contralateral hip flexion and knee extension +5/5.   Extremity: Left lower extremity externally rotated slightly, no shortening    Temp:  [97.9  F (36.6  C)-98.4  F (36.9  C)] 97.9  F (36.6  C)  Pulse:  [] 88  Resp:  [12-20] 20  BP: (147-180)/(70-80) 180/78  SpO2:  [97 %-100 %] 99 %    RADIOGRAPHIC EVALUATION:  Radiographs personally reviewed.  EXAM: MR HIP LEFT W/O CONTRAST  LOCATION: [facility]  DATE: 3/24/2025     INDICATION: further evaluate fracture  COMPARISON: CT 03/24/2025  TECHNIQUE: Unenhanced.     FINDINGS:     LEFT HIP:   -Labrum: Degeneration of the anterosuperior labrum.   -Cartilage: Likely grade II chondromalacia of the left hip. No subchondral edema.  -Joint space: Small joint effusion.   -Joint capsule/ligaments: Intact joint capsule. Ligamentum teres is attenuated.     MUSCLES AND TENDONS:   -Gluteal: Gluteus minimus tendon is intact. The greater trochanter anterior facet is nondisplaced. There is likely reactive edema within the tendon. No significant muscular atrophy. There is mild displacement of the lateral and superior posterior facets   of the greater trochanter with associated small tears of the gluteus medius tendon. No focal muscular atrophy.  -Proximal hamstring: Mild proximal hamstring tendinopathy. No significant tear.   -Iliopsoas: No tendon tear or tendinopathy. No significant bursitis.  -Rectus femoris origin: No tear or tendinopathy.     BONES:   -There is a fracture of the proximal femur involving the greater trochanter which is mildly displaced superiorly. There is nondisplaced extension into the intertrochanteric femur (series 3 and 4 image 9 or series 5 and 6 image 12). The lesser trochanter   is spared.  "  -Mild left sacroiliac joint arthrosis. There is likely reactive edema in the left sacral ala.     SOFT TISSUES:   -Marked edema about the proximal femoral fracture with nonorganized fluid/blood products extending into the adjacent musculature.     INTRA-PELVIC CONTENTS:  -Visualized portions are normal.                                                                      IMPRESSION:  1.  Proximal left femoral fracture with mildly displaced involvement of the greater trochanter and nondisplaced extension into the intertrochanteric femur. The lesser trochanter spared.  2.  Mild underlying left hip osteoarthritis with a small joint effusion.    LABORATORY DATA:     Lab Results   Component Value Date    INR 1.07 03/25/2025       Lab Results   Component Value Date    WBC 6.5 03/25/2025     Lab Results   Component Value Date    RBC 3.19 03/25/2025     Lab Results   Component Value Date    HGB 9.9 03/25/2025     Lab Results   Component Value Date    HCT 27.1 03/25/2025     No components found for: \"MCT\"  Lab Results   Component Value Date    MCV 85 03/25/2025     Lab Results   Component Value Date    MCH 31.0 03/25/2025     Lab Results   Component Value Date    MCHC 36.5 03/25/2025     Lab Results   Component Value Date    RDW 11.5 03/25/2025     Lab Results   Component Value Date     03/25/2025         Deonte Harris PA-C/Dr. Aparicio  [facility]        "

## 2025-03-25 NOTE — PLAN OF CARE
Problem: Pain Acute  Goal: Optimal Pain Control and Function  Outcome: Progressing  Intervention: Prevent or Manage Pain  Recent Flowsheet Documentation  Taken 3/25/2025 0930 by Rukhsana Quick, RN  Medication Review/Management: medications reviewed   Goal Outcome Evaluation:      Plan of Care Reviewed With: patient  Pt. Denied pain at rest. Scheduled tylenol administered.   Pt. Declined to use the bed pan today, pt. Up to the commode with assist of one before surgery.   Pts. Son here at the time that she went to [facility].     Rukhsana Quick, RN

## 2025-03-25 NOTE — ANESTHESIA POSTPROCEDURE EVALUATION
Patient: Ze Pabon    Procedure: Procedure(s):  OPEN REDUCTION INTERNAL FIXATION, FRACTURE, FEMUR, PROXIMAL       Anesthesia Type:  MAC    Note:  Disposition: Inpatient   Postop Pain Control: Uneventful            Sign Out: Well controlled pain   PONV: No   Neuro/Psych: Uneventful            Sign Out: Acceptable/Baseline neuro status   Airway/Respiratory: Uneventful            Sign Out: Acceptable/Baseline resp. status   CV/Hemodynamics: Uneventful            Sign Out: Acceptable CV status; No obvious hypovolemia; No obvious fluid overload   Other NRE: NONE   DID A NON-ROUTINE EVENT OCCUR? No           Last vitals:  Vitals Value Taken Time   /77 03/25/25 1700   Temp 35.9  C (96.62  F) 03/25/25 1702   Pulse 83 03/25/25 1702   Resp 14 03/25/25 1702   SpO2 98 % 03/25/25 1702   Vitals shown include unfiled device data.    Electronically Signed By: Joe Palacios MD  March 25, 2025  5:03 PM

## 2025-03-25 NOTE — ED NOTES
"[facility] ED Handoff Report    ED Chief Complaint:      ED Diagnosis:  (S72.002A) Hip fracture, left, closed, initial encounter (H)  (primary encounter diagnosis)  Comment:    Plan:      (M25.552) Hip pain, left  Comment:    Plan:      (W19.XXXA) Fall, initial encounter  Comment:    Plan:      (S72.145A) Closed nondisplaced intertrochanteric fracture of left femur, initial encounter (H)  Comment:    Plan: Case Request: OPEN REDUCTION INTERNAL FIXATION,        FRACTURE, FEMUR, PROXIMAL             (E11.65) Uncontrolled type 2 diabetes mellitus with hyperglycemia (H)  Comment:    Plan:         PMH:  History reviewed. No pertinent past medical history.     Code Status:  No Order     Falls Risk: Yes Band: Applied    Current Living Situation/Residence: lives with their son or daughter      Elimination Status: Continent: Yes     Activity Level: SBA at baseline but BEDREST now    Patients Preferred Language:  StashMetricshenrique     Needed: Yes Where is the patient located?    Vital Signs:  BP (!) 155/77   Pulse 83   Temp 98.4  F (36.9  C) (Tympanic)   Resp 12   Ht 1.6 m (5' 3\")   Wt 54 kg (119 lb)   SpO2 100%   BMI 21.08 kg/m       Cardiac Rhythm: regular radial pulse at 82 bpm    Pain Score: 0/10 at rest in bed; had tylenol earlier, declined oxycodone, only hurts when moving    Is the Patient Confused:  No    Last Food or Drink: 03/24/25 at morning    Focused Assessment:  left upper thigh lateral pain and tenderness, cms intact    Tests Performed: Done: Labs and Imaging    Treatments Provided:  Tylenol    Family Dynamics/Concerns: No    Family Updated On Visitor Policy: Yes    Plan of Care Communicated to Family: Yes    Who Was Updated about Plan of Care: daughter at bedside    Belongings Checklist Done and Signed by Patient: Yes    Medications sent with patient: N/A    Covid: asymptomatic , not tested    Additional Information: pt has not been taking blood sugar or diabetic meds for a while    Dana " Dario, RN 3/24/2025 7:39 PM

## 2025-03-25 NOTE — CONSULTS
Care Management Initial Consult    General Information  Assessment completed with: Patient, patient  Type of CM/SW Visit: Initial Assessment    Primary Care Provider verified and updated as needed:     Readmission within the last 30 days:           Advance Care Planning: Advance Care Planning Reviewed: no concerns identified          Communication Assessment  Patient's communication style: spoken language (non-English)    Hearing Difficulty or Deaf: no   Wear Glasses or Blind: yes    Cognitive  Cognitive/Neuro/Behavioral: WDL                      Living Environment:   People in home: child(rizwan), adult, spouse, grandchild(rizwan)     Current living Arrangements: house      Able to return to prior arrangements: yes  Living Arrangement Comments: Lives in a house with family    Family/Social Support:  Care provided by: self  Provides care for: no one  Marital Status:   Support system: , Children          Description of Support System: Supportive    Support Assessment: Adequate family and caregiver support    Current Resources:   Patient receiving home care services: No        Community Resources: None  Equipment currently used at home: cane, straight  Supplies currently used at home: None    Employment/Financial:  Employment Status: retired       Does the patient's insurance plan have a 3 day qualifying hospital stay waiver?  No    Lifestyle & Psychosocial Needs:  Social Drivers of Health     Food Insecurity: Not on file   Depression: At risk (3/30/2022)    Received from [employer]    PHQ-2     PHQ-2 Score: 4   Housing Stability: Not on file   Tobacco Use: Low Risk  (3/30/2022)    Received from [employer]    Patient History     Smoking Tobacco Use: Never     Smokeless Tobacco Use: Never     Passive Exposure: Not on file   Financial Resource Strain: Not on file   Alcohol Use: Not on file   Transportation Needs: Not on file   Physical Activity: Not on file   Interpersonal Safety: Low Risk  (3/24/2025)     Interpersonal Safety     Do you feel physically and emotionally safe where you currently live?: Yes     Within the past 12 months, have you been hit, slapped, kicked or otherwise physically hurt by someone?: No     Within the past 12 months, have you been humiliated or emotionally abused in other ways by your partner or ex-partner?: No   Stress: Not on file   Social Connections: Not on file   Health Literacy: Not on file       Functional Status:  Prior to admission patient needed assistance:   Dependent ADLs:: Ambulation-cane, Independent  Dependent IADLs:: Independent       Mental Health Status:  Mental Health Status: No Current Concerns       Chemical Dependency Status:  Chemical Dependency Status: No Current Concerns       Discussed  Partnership in Safe Discharge Planning  document with patient/family: No    Additional Information:  Initial assessment completed with patient at bedside. [employer]  used over the phone with [employer].   Patient lives with her , son, daughter in law and 4 grandchildren. Patient reports she is independent at baseline. Uses a cane if needed. Her  is home with her most of the time.   Medicare card uploaded into media tab.     Next Steps: care management will follow for discharge needs such as home care or TCU and discuss with patient after therapy evals.     Abigail Su, NILSONSW

## 2025-03-25 NOTE — ANESTHESIA PROCEDURE NOTES
"Femoral Procedure Note    Pre-Procedure   Staff -        Anesthesiologist:  Joe Palacios MD       Performed By: anesthesiologist       Procedure Start/Stop Times: 3/25/2025 2:20 PM       Pre-Anesthestic Checklist: patient identified, IV checked, site marked, risks and benefits discussed, informed consent, monitors and equipment checked, pre-op evaluation, at physician/surgeon's request and post-op pain management  Timeout:       Correct Patient: Yes        Correct Procedure: Yes        Correct Site: Yes        Correct Position: Yes        Correct Laterality: Yes        Site Marked: Yes  Procedure Documentation  Procedure: Femoral         Laterality: left       Patient Position: supine       Skin prep: Chloraprep       Needle Type: short bevel       Needle Gauge: 20.        Needle Length (Inches): 4        Ultrasound guided       1. Ultrasound was used to identify targeted nerve, plexus, vascular marker, or fascial plane and place a needle adjacent to it in real-time.       2. Ultrasound was used to visualize the spread of anesthetic in close proximity to the above referenced structure.       3. A permanent image is entered into the patient's record.       4. The visualized anatomic structures appeared normal.       5. There were no apparent abnormal pathologic findings.    Assessment/Narrative         The placement was negative for: blood aspirated, painful injection and site bleeding       Paresthesias: No.       Bolus given via needle..        Secured via.        Insertion/Infusion Method: Single Shot       Complications: none       Injection made incrementally with aspirations every 5 mL.    Medication(s) Administered   Ropivacaine 0.5% PF (Infiltration) - Infiltration   20 mL - 3/25/2025 2:20:00 PM  Medication Administration Time: 3/25/2025 2:20 PM      FOR [facility] ([facility]) ONLY:   Pain Team Contact information: please page the Pain Team Via [employer]. Search \"Pain\". During daytime hours, please page the " attending first. At night please page the resident first.

## 2025-03-25 NOTE — PLAN OF CARE
PRIMARY DIAGNOSIS: ACUTE PAIN  OUTPATIENT/OBSERVATION GOALS TO BE MET BEFORE DISCHARGE:  1. Pain Status: Improved-controlled with oral pain medications.    2. Return to near baseline physical activity: Yes    3. Cleared for discharge by consultants (if involved): No    Discharge Planner Nurse   Safe discharge environment identified: No  Barriers to discharge: Yes       Entered by: Coco Soni RN 03/24/2025 11:37 PM     Please review provider order for any additional goals.   Nurse to notify provider when observation goals have been met and patient is ready for discharge.Goal Outcome Evaluation:      Plan of Care Reviewed With: patient    Overall Patient Progress: improvingOverall Patient Progress: improving

## 2025-03-25 NOTE — ANESTHESIA PREPROCEDURE EVALUATION
Anesthesia Pre-Procedure Evaluation    Patient: Ze Pabon   MRN: [id number] : 1960        Procedure : Procedure(s):  OPEN REDUCTION INTERNAL FIXATION, FRACTURE, FEMUR, PROXIMAL          History reviewed. No pertinent past medical history.   History reviewed. No pertinent surgical history.   No Known Allergies   Social History     Tobacco Use    Smoking status: Not on file    Smokeless tobacco: Not on file   Substance Use Topics    Alcohol use: Not on file      Wt Readings from Last 1 Encounters:   25 54.2 kg (119 lb 7.8 oz)        Anesthesia Evaluation            ROS/MED HX  ENT/Pulmonary:       Neurologic:       Cardiovascular:       METS/Exercise Tolerance:     Hematologic:     (+)      anemia,          Musculoskeletal:       GI/Hepatic:       Renal/Genitourinary:     (+) renal disease, type: CRI,            Endo:     (+)  type II DM,                    Psychiatric/Substance Use:       Infectious Disease:       Malignancy:       Other:            Physical Exam    Airway        Mallampati: III    Neck ROM: full     Respiratory Devices and Support         Dental     Comment: Bottom dentures        Cardiovascular   cardiovascular exam normal          Pulmonary   pulmonary exam normal                OUTSIDE LABS:  CBC:   Lab Results   Component Value Date    WBC 6.5 2025    WBC 8.1 2025    HGB 9.9 (L) 2025    HGB 10.9 (L) 2025    HCT 27.1 (L) 2025    HCT 30.1 (L) 2025     2025     2025     BMP:   Lab Results   Component Value Date     2025     2025    POTASSIUM 3.8 2025    POTASSIUM 3.8 2025    CHLORIDE 107 2025    CHLORIDE 107 2025    CO2 20 (L) 2025    CO2 20 (L) 2025    BUN 28.7 (H) 2025    BUN 28.7 (H) 2025    CR 1.30 (H) 2025    CR 1.30 (H) 2025     (H) 2025     (H) 2025     COAGS:   Lab Results   Component Value Date    INR 1.07  "03/25/2025     POC: No results found for: \"BGM\", \"HCG\", \"HCGS\"  HEPATIC:   Lab Results   Component Value Date    ALBUMIN 3.5 03/25/2025    PROTTOTAL 6.2 (L) 03/25/2025    ALT 14 03/25/2025    AST 18 03/25/2025    ALKPHOS 107 03/25/2025    BILITOTAL 0.5 03/25/2025     OTHER:   Lab Results   Component Value Date    A1C 12.8 (H) 03/24/2025    DARWIN 9.1 03/25/2025    DARWIN 9.1 03/25/2025    PHOS 3.3 03/25/2025    TSH 3.88 03/25/2025       Anesthesia Plan    ASA Status:  3    NPO Status:  NPO Appropriate    Anesthesia Type: MAC.     - Airway: LMA   Induction: Intravenous, Propofol.   Maintenance: TIVA.        Consents    Anesthesia Plan(s) and associated risks, benefits, and realistic alternatives discussed. Questions answered and patient/representative(s) expressed understanding.     - Discussed: Risks, Benefits and Alternatives for BOTH SEDATION and the PROCEDURE were discussed     - Discussed with:  Patient            Postoperative Care    Pain management: IV analgesics, Oral pain medications.   PONV prophylaxis: Ondansetron (or other 5HT-3), Dexamethasone or Solumedrol     Comments:               Joe Palacios MD    Clinically Significant Risk Factors Present on Admission         # Hyponatremia: Lowest Na = 130 mmol/L in last 2 days, will monitor as appropriate                # Anemia: based on hgb <11      # DMII: A1C = 12.8 % (Ref range: <5.7 %) within past 6 months                 "

## 2025-03-25 NOTE — PLAN OF CARE
Problem: Adult Inpatient Plan of Care  Goal: Optimal Comfort and Wellbeing  Outcome: Progressing  Intervention: Provide Person-Centered Care  Recent Flowsheet Documentation  Taken 3/25/2025 0000 by Juan Daniel Linares RN  Trust Relationship/Rapport: care explained     Problem: Pain Acute  Goal: Optimal Pain Control and Function  Outcome: Progressing  Intervention: Prevent or Manage Pain  Recent Flowsheet Documentation  Taken 3/25/2025 0000 by Juan Daniel Linares RN  Medication Review/Management: medications reviewed     Problem: Comorbidity Management  Goal: Blood Glucose Levels Within Targeted Range  Outcome: Progressing  Intervention: Monitor and Manage Glycemia  Recent Flowsheet Documentation  Taken 3/25/2025 0000 by Juan Daniel Linares RN  Medication Review/Management: medications reviewed   Goal Outcome Evaluation:       VSS. Pt states she has L hip pain with movement and no pain at rest. BG at 0000 was 324, 5 units of insulin given. BG at 0400 was 172, 1 unit of insulin given. NPO since midnight for procedure later today. SCDs on. CMS intact. CHG wipes performed x2 earlier in shift and early am. Strict bedrest ordered. Purewick in place. Hmong speaking, family at bedside to help translate.  utilized.

## 2025-03-25 NOTE — ANESTHESIA PROCEDURE NOTES
"Other ([facility]) Procedure Note    Pre-Procedure   Staff -        Anesthesiologist:  Joe Palacios MD       Performed By: anesthesiologist       Procedure Start/Stop Times: 3/25/2025 2:25 PM       Pre-Anesthestic Checklist: patient identified, IV checked, site marked, risks and benefits discussed, informed consent, monitors and equipment checked, pre-op evaluation, at physician/surgeon's request and post-op pain management  Timeout:       Correct Patient: Yes        Correct Procedure: Yes        Correct Site: Yes        Correct Position: Yes        Correct Laterality: Yes        Site Marked: Yes  Procedure Documentation  Procedure: Other ([facility])         Laterality: left       Patient Position: supine       Skin prep: Chloraprep       Needle Type: short bevel       Needle Gauge: 20.        Needle Length (Inches): 4        Ultrasound guided       1. Ultrasound was used to identify targeted nerve, plexus, vascular marker, or fascial plane and place a needle adjacent to it in real-time.       2. Ultrasound was used to visualize the spread of anesthetic in close proximity to the above referenced structure.       3. A permanent image is entered into the patient's record.       4. The visualized anatomic structures appeared normal.       5. There were no apparent abnormal pathologic findings.    Assessment/Narrative         The placement was negative for: blood aspirated, painful injection and site bleeding       Paresthesias: No.       Bolus given via needle..        Secured via.        Insertion/Infusion Method: Single Shot       Complications: none       Injection made incrementally with aspirations every 5 mL.    Medication(s) Administered   Ropivacaine 0.5% PF (Infiltration) - Infiltration   4 mL - 3/25/2025 2:25:00 PM  Medication Administration Time: 3/25/2025 2:25 PM      FOR [facility] ([facility]) ONLY:   Pain Team Contact information: please page the Pain Team Via [employer]. Search \"Pain\". During daytime hours, please " page the attending first. At night please page the resident first.

## 2025-03-25 NOTE — H&P
[facility]    History and Physical - Hospitalist Service       Date of Admission:  3/24/2025    Assessment & Plan      Ze Pabon is a 65 year old female admitted on 3/24/2025. She has a history of T2DM, HTN, HLD and is admitted for left hip fx 2/2 mechanical fall and hyperglycemia in the setting of uncontrolled diabetes. She requires orthopedic consultation, pain control, and management of hyperglycemia.     Left hip fx  Mechanical Fall   Patient reported to the ED with left hip pain and difficulty ambulating following a mechanical fall on 3/18. Found to have proximal left femoral fracture with mildly displaced involvement of the greater trochanter and nondisplaced extension into the intertrochanteric femur. ED provider discussed with Dr. Smith, [facility]. He recommended either surgery vs. At least 6 weeks nonweightbearing. Patient leaning towards surgical option but would like to continue to discuss with family. CT Head & C-spine ordered due to mechanism of fall, no acute findings.   - Orthopedic surgery consult. On OR schedule for 3/25 afternoon  - NPO at midnight  - Pain control: PRN tylenol, oxycodone   - PT/CM consult  - EKG, INR, Type & Screen ordered for preoperative evaluation.    T2DM  Hyperglycemia    A1c 12.8. Presented with . Patient reports poor compliance with diabetes medications and hasn't taken anything in almost a year. Has not followed up with provider since 2022. Total daily insulin requirement per weight is 21.6 units. Will require close monitoring of blood sugars and tight control to optimize healing post operatively.   - 8 units Lantus at bedtime  - MDSSI   - q4hr BG checks   - will likely require meal time insulin once eating after surgery    CKD vs. RACHEL  Cr 1.46 on admission. 1.52 in 2022. Likely chronic kidney disease secondary to uncontrolled diabetes. Was previously on lisinopril for BP control & kidney protection. Could consider restarting upon  discharge. S/p 1L NS bolus in ED.   - daily BMP   - urine culture pending           Diet: Combination Diet Regular Diet Adult; Moderate Consistent Carb (60 g CHO per Meal) Diet  NPO for Medical/Clinical Reasons Except for: Meds  DVT Prophylaxis: Pneumatic Compression Devices. High risk for DVT given fx and recent travel. Will need anticoagulation post operatively   Witt Catheter: Not present  Fluids: s/p 1L NS   Lines: None     Cardiac Monitoring: None  Code Status: Full Code    Clinically Significant Risk Factors Present on Admission         # Hyponatremia: Lowest Na = 130 mmol/L in last 2 days, will monitor as appropriate                  # Anemia: based on hgb <11      # DMII: A1C = 12.8 % (Ref range: <5.7 %) within past 6 months               Disposition Plan      Expected Discharge Date: 03/26/2025                The patient's care was discussed with the Chief Resident/Fellow and will be formally discussed with the attending during AM rounds.       Amairani Hennessy MD  Hospitalist Service  [facility]  Securely message with [employer] (more info)  Text page via [facility] Paging/Directory   ______________________________________________________________________    Chief Complaint   Hip pain    History is obtained from the patient, family members, and ER physician    History of Present Illness   Ze Pabon is a 65 year old female who has a history of type 2 diabetes, HTN, HLD.     Patient presents to the ED with hip pain. On 3/18 patient reports she was in [place]. She was in the dressing room of a store and was trying on pants. She fell and landed on her left hip. Denies hitting her head or LOC. She reports feeling a little dizzy after the fall, but no symptoms preceding the fall. She has been ambulating through the pain, but the pain has been persistent. Presented to the ED today because she returned from out of state and has been requiring a cane to ambulate.     Hx of T2DM. Patient reports  she stopped taking her medications 10 months ago. Has not seen a provider since 2022. Per daughter, patient has been hospitalized several times due to high blood sugars.     Patient lives at home with , son, daughter in law, and 4 grand kids. Lives in a split level home with stairs.     In the ED: Given age & mechanism of injury, CT head/neck obtained which did not show acute findings. CXR without acute findings. CT of the left hip showed mildly displaced fracture through the base of the greater trochanter. No definite intertrochanteric extension, however, there is increased density of the intramedullary canal in the intertrochanteric region which may reflect edema and possibly an occult fracture.   ED provider discussed with [facility] who recommended MRI. MRI showed proximal left femoral fracture with mildly displaced involvement of the greater trochanter and nondisplaced extension into the intertrochanteric femur. The lesser trochanter spared. Mild underlying left hip osteoarthritis with a small joint effusion. Patient was also found to have . Given 4u Novolog and IVF.     Past Medical History    History reviewed. No pertinent past medical history.    Past Surgical History   History reviewed. No pertinent surgical history.    Prior to Admission Medications   Prior to Admission Medications   Prescriptions Last Dose Informant Patient Reported? Taking?   acetaminophen (TYLENOL) 500 MG tablet   Yes Yes   Sig: Take 500-1,000 mg by mouth every 6 hours as needed for mild pain.   ibuprofen (ADVIL/MOTRIN) 200 MG tablet   Yes Yes   Sig: Take 200 mg by mouth every 4 hours as needed for pain.      Facility-Administered Medications: None           Physical Exam   Vital Signs: Temp: 98  F (36.7  C) Temp src: Oral BP: (!) 171/80 Pulse: 85   Resp: 18 SpO2: 99 % O2 Device: None (Room air)    Weight: 119 lbs 0 oz    GEN: Pleasant female, in no acute distress.   HEENT: Normocephalic, atraumatic. Extraoccular  eye movements intact. Anicteric sclera. Moist mucous membranes.   NECK: Supple. No cervical or supraclavicular adenopathy.   PULM: Non-labored breathing. No use of accessory muscles. Clear to ausculation bilaterally. No wheezes or crackles.   CV: Regular rate and rhythm. Normal S1, S2. No rubs, murmurs, or gallops.    ABDOMEN: Normoactive bowel sounds. Non-tender to palpation. Non-distended.    EXTREMITES:  No clubbing, cyanosis, or edema. Tenderness to left lateral hip. No deformities. No ecchymosis or edema.   NEURO:  Awake. Oriented to person, place, time and situation. Cranial nerves 2-12 grossly intact. Moving all extremities.    PSYCH: Calm. Appropriate affect, insight, judgment.       Medical Decision Making             Data     I have personally reviewed the following data over the past 24 hrs:    8.1  \   10.9 (L)   / 150     130 (L) 98 33.7 (H) /  573 (HH)   4.5 20 (L) 1.46 (H) \     ALT: 14 AST: 13 AP: 132 TBILI: 0.4   ALB: 3.7 TOT PROTEIN: 6.8 LIPASE: N/A     TSH: N/A T4: N/A A1C: 12.8 (H)       Imaging results reviewed over the past 24 hrs:   Recent Results (from the past 24 hours)   Head CT w/o contrast    Narrative    EXAM: CT HEAD W/O CONTRAST, CT CERVICAL SPINE W/O CONTRAST  LOCATION: [facility]  DATE: 3/24/2025    INDICATION: Fall  COMPARISON: None   TECHNIQUE:   1) Routine CT Head without IV contrast. Multiplanar reformats. Dose reduction techniques were used.  2) Routine CT Cervical Spine without IV contrast. Multiplanar reformats. Dose reduction techniques were used.    FINDINGS:   HEAD CT:   INTRACRANIAL CONTENTS: No acute intracranial hemorrhage, extraaxial fluid collection, or mass effect. No evidence of an acute transcortical confluent infarct. Mild presumed chronic small vessel ischemic changes. Mild-moderate generalized cerebral and   mild cerebellar parenchymal volume loss. No hydrocephalus.    VISUALIZED ORBITS/SINUSES/MASTOIDS: No acute intraorbital finding. No  significant paranasal sinus or mastoid mucosal disease.    BONES/SOFT TISSUES: No acute calvarial injury or significant scalp hematoma.    CERVICAL SPINE CT:   VERTEBRA: Diffuse osseous demineralization. No acute displaced fracture, traumatic listhesis, or compression deformity. Straightening of the normal cervical lordosis with 1-2 mm likely degenerative retrolisthesis at C4-C5. Subchondral cystic change   involving the inferior C4 and superior C5 endplates, greater at the former where there is vacuum phenomenon. Vacuum phenomena are also seen in the ventral epidural space at C3 inferior endplate level and C6 vertebral level. Moderate intervertebral disc   height loss at C4-C5, mild-moderate at C5-C6, and mild at C3-C4. Mild scattered facet arthrosis. Curvilinear periodontal ligamentous calcifications, likely CPPD related.    CANAL/FORAMINA: Posterior disc-osteophyte complexes most notably at C4-C5 where a prominent central/left central disc-osteophyte complex contributes to mild spinal canal stenosis. Multilevel foraminal narrowing, worst and moderate at C4-C5 bilaterally.    EXTRASPINAL: No prevertebral edema. Clear visualized lungs.      Impression    IMPRESSION:  HEAD CT:  1.  No acute intracranial abnormality.  2.  Chronic aged-related intracranial changes.    CERVICAL SPINE CT:  1.  No CT evidence for acute fracture or post traumatic subluxation.  2.  Diffuse osseous demineralization and multilevel spondylosis, as described.   CT Cervical Spine w/o Contrast    Narrative    EXAM: CT HEAD W/O CONTRAST, CT CERVICAL SPINE W/O CONTRAST  LOCATION: [facility]  DATE: 3/24/2025    INDICATION: Fall  COMPARISON: None   TECHNIQUE:   1) Routine CT Head without IV contrast. Multiplanar reformats. Dose reduction techniques were used.  2) Routine CT Cervical Spine without IV contrast. Multiplanar reformats. Dose reduction techniques were used.    FINDINGS:   HEAD CT:   INTRACRANIAL CONTENTS: No acute  intracranial hemorrhage, extraaxial fluid collection, or mass effect. No evidence of an acute transcortical confluent infarct. Mild presumed chronic small vessel ischemic changes. Mild-moderate generalized cerebral and   mild cerebellar parenchymal volume loss. No hydrocephalus.    VISUALIZED ORBITS/SINUSES/MASTOIDS: No acute intraorbital finding. No significant paranasal sinus or mastoid mucosal disease.    BONES/SOFT TISSUES: No acute calvarial injury or significant scalp hematoma.    CERVICAL SPINE CT:   VERTEBRA: Diffuse osseous demineralization. No acute displaced fracture, traumatic listhesis, or compression deformity. Straightening of the normal cervical lordosis with 1-2 mm likely degenerative retrolisthesis at C4-C5. Subchondral cystic change   involving the inferior C4 and superior C5 endplates, greater at the former where there is vacuum phenomenon. Vacuum phenomena are also seen in the ventral epidural space at C3 inferior endplate level and C6 vertebral level. Moderate intervertebral disc   height loss at C4-C5, mild-moderate at C5-C6, and mild at C3-C4. Mild scattered facet arthrosis. Curvilinear periodontal ligamentous calcifications, likely CPPD related.    CANAL/FORAMINA: Posterior disc-osteophyte complexes most notably at C4-C5 where a prominent central/left central disc-osteophyte complex contributes to mild spinal canal stenosis. Multilevel foraminal narrowing, worst and moderate at C4-C5 bilaterally.    EXTRASPINAL: No prevertebral edema. Clear visualized lungs.      Impression    IMPRESSION:  HEAD CT:  1.  No acute intracranial abnormality.  2.  Chronic aged-related intracranial changes.    CERVICAL SPINE CT:  1.  No CT evidence for acute fracture or post traumatic subluxation.  2.  Diffuse osseous demineralization and multilevel spondylosis, as described.   CT Hip Left w/o Contrast    Narrative    EXAM: CT HIP LEFT W/O CONTRAST  LOCATION: [facility]  DATE:  3/24/2025    INDICATION: Fall, pain.   COMPARISON: None available.   TECHNIQUE: Noncontrast. Axial, sagittal and coronal thin-section reconstruction. Dose reduction techniques were used.     FINDINGS:     BONES:  -Diffuse osseous demineralization. Mildly displaced fracture through the base of the greater trochanter. No definite intertrochanteric extension of the fracture, however, there is increased density of the intramedullary canal within the intertrochanteric   region which may reflect edema and possibly an occult fracture. Mild degenerative arthrosis of the left hip and the pubic symphysis.     SOFT TISSUES:  -Soft tissue edema involving the lateral proximal thigh.  -Scattered arterial vascular calcifications.       Impression    IMPRESSION:  1.  Mildly displaced fracture through the base of the greater trochanter. No definite intertrochanteric extension, however, there is increased density of the intramedullary canal in the intertrochanteric region which may reflect edema and possibly an   occult fracture. MRI would be more sensitive for evaluation if clinically indicated.  2.  Mild degenerative arthrosis of the left hip.    NOTE: ABNORMAL REPORT    THE DICTATION ABOVE DESCRIBES AN ABNORMALITY FOR WHICH FOLLOW-UP IS NEEDED.       Chest XR,  PA & LAT    Narrative    EXAM: XR CHEST 2 VIEWS  LOCATION: [facility]  DATE: 3/24/2025    INDICATION: left rib pain after fall  COMPARISON: 12/09/2020      Impression    IMPRESSION: Stable chest with no acute cardiopulmonary or musculoskeletal abnormalities. Mildly calcified thoracic aortic arch. Slight thoracolumbar spinal curvature.   MR Hip Left w/o Contrast    Narrative    EXAM: MR HIP LEFT W/O CONTRAST  LOCATION: [facility]  DATE: 3/24/2025    INDICATION: further evaluate fracture  COMPARISON: CT 03/24/2025  TECHNIQUE: Unenhanced.    FINDINGS:    LEFT HIP:   -Labrum: Degeneration of the anterosuperior labrum.    -Cartilage: Likely grade II chondromalacia of the left hip. No subchondral edema.  -Joint space: Small joint effusion.   -Joint capsule/ligaments: Intact joint capsule. Ligamentum teres is attenuated.    MUSCLES AND TENDONS:   -Gluteal: Gluteus minimus tendon is intact. The greater trochanter anterior facet is nondisplaced. There is likely reactive edema within the tendon. No significant muscular atrophy. There is mild displacement of the lateral and superior posterior facets   of the greater trochanter with associated small tears of the gluteus medius tendon. No focal muscular atrophy.  -Proximal hamstring: Mild proximal hamstring tendinopathy. No significant tear.   -Iliopsoas: No tendon tear or tendinopathy. No significant bursitis.  -Rectus femoris origin: No tear or tendinopathy.    BONES:   -There is a fracture of the proximal femur involving the greater trochanter which is mildly displaced superiorly. There is nondisplaced extension into the intertrochanteric femur (series 3 and 4 image 9 or series 5 and 6 image 12). The lesser trochanter   is spared.   -Mild left sacroiliac joint arthrosis. There is likely reactive edema in the left sacral ala.    SOFT TISSUES:   -Marked edema about the proximal femoral fracture with nonorganized fluid/blood products extending into the adjacent musculature.    INTRA-PELVIC CONTENTS:  -Visualized portions are normal.      Impression    IMPRESSION:  1.  Proximal left femoral fracture with mildly displaced involvement of the greater trochanter and nondisplaced extension into the intertrochanteric femur. The lesser trochanter spared.  2.  Mild underlying left hip osteoarthritis with a small joint effusion.

## 2025-03-26 ENCOUNTER — VIRTUAL VISIT (OUTPATIENT)
Dept: INTERPRETER SERVICES | Facility: CLINIC | Age: 65
End: 2025-03-26
Payer: MEDICARE

## 2025-03-26 ENCOUNTER — APPOINTMENT (OUTPATIENT)
Dept: PHYSICAL THERAPY | Facility: HOSPITAL | Age: 65
DRG: 482 | End: 2025-03-26
Payer: MEDICARE

## 2025-03-26 LAB
ANION GAP SERPL CALCULATED.3IONS-SCNC: 9 MMOL/L (ref 7–15)
BACTERIA UR CULT: NORMAL
BUN SERPL-MCNC: 23 MG/DL (ref 8–23)
CALCIUM SERPL-MCNC: 8.9 MG/DL (ref 8.8–10.4)
CHLORIDE SERPL-SCNC: 104 MMOL/L (ref 98–107)
CREAT SERPL-MCNC: 1.11 MG/DL (ref 0.51–0.95)
EGFRCR SERPLBLD CKD-EPI 2021: 55 ML/MIN/1.73M2
ERYTHROCYTE [DISTWIDTH] IN BLOOD BY AUTOMATED COUNT: 11.7 % (ref 10–15)
GLUCOSE BLDC GLUCOMTR-MCNC: 231 MG/DL (ref 70–99)
GLUCOSE BLDC GLUCOMTR-MCNC: 253 MG/DL (ref 70–99)
GLUCOSE BLDC GLUCOMTR-MCNC: 261 MG/DL (ref 70–99)
GLUCOSE BLDC GLUCOMTR-MCNC: 264 MG/DL (ref 70–99)
GLUCOSE BLDC GLUCOMTR-MCNC: 269 MG/DL (ref 70–99)
GLUCOSE BLDC GLUCOMTR-MCNC: 290 MG/DL (ref 70–99)
GLUCOSE BLDC GLUCOMTR-MCNC: 308 MG/DL (ref 70–99)
GLUCOSE BLDC GLUCOMTR-MCNC: 393 MG/DL (ref 70–99)
GLUCOSE SERPL-MCNC: 260 MG/DL (ref 70–99)
HCO3 SERPL-SCNC: 22 MMOL/L (ref 22–29)
HCT VFR BLD AUTO: 26.3 % (ref 35–47)
HGB BLD-MCNC: 9.8 G/DL (ref 11.7–15.7)
MCH RBC QN AUTO: 31.3 PG (ref 26.5–33)
MCHC RBC AUTO-ENTMCNC: 37.3 G/DL (ref 31.5–36.5)
MCV RBC AUTO: 84 FL (ref 78–100)
PLATELET # BLD AUTO: 156 10E3/UL (ref 150–450)
POTASSIUM SERPL-SCNC: 3.9 MMOL/L (ref 3.4–5.3)
PTH-INTACT SERPL-MCNC: 40 PG/ML (ref 15–65)
RBC # BLD AUTO: 3.13 10E6/UL (ref 3.8–5.2)
SODIUM SERPL-SCNC: 135 MMOL/L (ref 135–145)
WBC # BLD AUTO: 6.9 10E3/UL (ref 4–11)

## 2025-03-26 PROCEDURE — 250N000013 HC RX MED GY IP 250 OP 250 PS 637: Performed by: STUDENT IN AN ORGANIZED HEALTH CARE EDUCATION/TRAINING PROGRAM

## 2025-03-26 PROCEDURE — 97162 PT EVAL MOD COMPLEX 30 MIN: CPT | Mod: GP

## 2025-03-26 PROCEDURE — T1013 SIGN LANG/ORAL INTERPRETER: HCPCS | Mod: U4,TEL,95 | Performed by: INTERPRETER

## 2025-03-26 PROCEDURE — 36415 COLL VENOUS BLD VENIPUNCTURE: CPT | Performed by: STUDENT IN AN ORGANIZED HEALTH CARE EDUCATION/TRAINING PROGRAM

## 2025-03-26 PROCEDURE — 80048 BASIC METABOLIC PNL TOTAL CA: CPT | Performed by: STUDENT IN AN ORGANIZED HEALTH CARE EDUCATION/TRAINING PROGRAM

## 2025-03-26 PROCEDURE — 97530 THERAPEUTIC ACTIVITIES: CPT | Mod: GP

## 2025-03-26 PROCEDURE — 250N000013 HC RX MED GY IP 250 OP 250 PS 637

## 2025-03-26 PROCEDURE — 120N000001 HC R&B MED SURG/OB

## 2025-03-26 PROCEDURE — 85014 HEMATOCRIT: CPT | Performed by: STUDENT IN AN ORGANIZED HEALTH CARE EDUCATION/TRAINING PROGRAM

## 2025-03-26 PROCEDURE — 250N000011 HC RX IP 250 OP 636: Performed by: STUDENT IN AN ORGANIZED HEALTH CARE EDUCATION/TRAINING PROGRAM

## 2025-03-26 PROCEDURE — 82310 ASSAY OF CALCIUM: CPT | Performed by: STUDENT IN AN ORGANIZED HEALTH CARE EDUCATION/TRAINING PROGRAM

## 2025-03-26 PROCEDURE — 83970 ASSAY OF PARATHORMONE: CPT | Performed by: STUDENT IN AN ORGANIZED HEALTH CARE EDUCATION/TRAINING PROGRAM

## 2025-03-26 RX ORDER — ASPIRIN 81 MG/1
81 TABLET ORAL 2 TIMES DAILY
Qty: 60 TABLET | Refills: 0 | Status: SHIPPED | OUTPATIENT
Start: 2025-03-26

## 2025-03-26 RX ORDER — ACETAMINOPHEN 325 MG/1
975 TABLET ORAL EVERY 8 HOURS
Qty: 100 TABLET | Refills: 0 | Status: SHIPPED | OUTPATIENT
Start: 2025-03-26

## 2025-03-26 RX ORDER — AMOXICILLIN 250 MG
1 CAPSULE ORAL 2 TIMES DAILY PRN
Qty: 30 TABLET | Refills: 0 | Status: SHIPPED | OUTPATIENT
Start: 2025-03-26

## 2025-03-26 RX ORDER — VITAMIN B COMPLEX
25 TABLET ORAL DAILY
Status: DISCONTINUED | OUTPATIENT
Start: 2025-03-26 | End: 2025-03-28 | Stop reason: HOSPADM

## 2025-03-26 RX ADMIN — CEFAZOLIN SODIUM 2 G: 2 SOLUTION INTRAVENOUS at 00:32

## 2025-03-26 RX ADMIN — CEFAZOLIN SODIUM 2 G: 2 SOLUTION INTRAVENOUS at 09:10

## 2025-03-26 RX ADMIN — ACETAMINOPHEN 975 MG: 325 TABLET ORAL at 09:26

## 2025-03-26 RX ADMIN — ONDANSETRON 4 MG: 2 INJECTION, SOLUTION INTRAMUSCULAR; INTRAVENOUS at 11:15

## 2025-03-26 RX ADMIN — ASPIRIN 81 MG: 81 TABLET, COATED ORAL at 09:10

## 2025-03-26 RX ADMIN — ASPIRIN 81 MG: 81 TABLET, COATED ORAL at 21:26

## 2025-03-26 RX ADMIN — ACETAMINOPHEN 975 MG: 325 TABLET ORAL at 17:30

## 2025-03-26 RX ADMIN — Medication 25 MCG: at 12:45

## 2025-03-26 ASSESSMENT — ACTIVITIES OF DAILY LIVING (ADL)
ADLS_ACUITY_SCORE: 49
ADLS_ACUITY_SCORE: 50
ADLS_ACUITY_SCORE: 49
ADLS_ACUITY_SCORE: 50
ADLS_ACUITY_SCORE: 50
ADLS_ACUITY_SCORE: 49
ADLS_ACUITY_SCORE: 49
ADLS_ACUITY_SCORE: 51
ADLS_ACUITY_SCORE: 49
ADLS_ACUITY_SCORE: 50
ADLS_ACUITY_SCORE: 49
ADLS_ACUITY_SCORE: 50
ADLS_ACUITY_SCORE: 49
ADLS_ACUITY_SCORE: 50
ADLS_ACUITY_SCORE: 49
ADLS_ACUITY_SCORE: 49
ADLS_ACUITY_SCORE: 50
ADLS_ACUITY_SCORE: 49

## 2025-03-26 NOTE — PLAN OF CARE
"Problem: Adult Inpatient Plan of Care  Goal: Plan of Care Review  Description: The Plan of Care Review/Shift note should be completed every shift.  The Outcome Evaluation is a brief statement about your assessment that the patient is improving, declining, or no change.  This information will be displayed automatically on your shiftnote.  Outcome: Progressing  Goal: Patient-Specific Goal (Individualized)  Description: You can add care plan individualizations to a care plan. Examples of Individualization might be:  \"Parent requests to be called daily at 9am for status\", \"I have a hard time hearing out of my right ear\", or \"Do not touch me to wake me up as it startlesme\".  Outcome: Progressing  Goal: Absence of Hospital-Acquired Illness or Injury  Outcome: Progressing  Intervention: Identify and Manage Fall Risk  Recent Flowsheet Documentation  Taken 3/25/2025 1820 by Jairo Amor RN  Safety Promotion/Fall Prevention:   activity supervised   assistive device/personal items within reach   clutter free environment maintained   lighting adjusted   nonskid shoes/slippers when out of bed   patient and family education   room near nurse's station   room organization consistent  Intervention: Prevent and Manage VTE (Venous Thromboembolism) Risk  Recent Flowsheet Documentation  Taken 3/25/2025 1820 by Jairo Amor RN  VTE Prevention/Management: SCDs on (sequential compression devices)  Goal: Optimal Comfort and Wellbeing  Outcome: Progressing  Intervention: Monitor Pain and Promote Comfort  Recent Flowsheet Documentation  Taken 3/25/2025 1820 by Jairo Amor RN  Pain Management Interventions: (PRN antiemtic given first for nausea)   cold applied   medication offered but refused  Goal: Readiness for Transition of Care  Outcome: Progressing     Problem: Delirium  Goal: Optimal Coping  Outcome: Progressing  Goal: Improved Behavioral Control  Outcome: Progressing  Intervention: Minimize Safety Risk  Recent Flowsheet " Documentation  Taken 3/25/2025 1820 by Jairo Amor RN  Enhanced Safety Measures:   assistive devices when indicated   pain management   review medications for side effects with activity  Goal: Improved Attention and Thought Clarity  Outcome: Progressing  Goal: Improved Sleep  Outcome: Progressing     Problem: Pain Acute  Goal: Optimal Pain Control and Function  Outcome: Progressing  Intervention: Develop Pain Management Plan  Recent Flowsheet Documentation  Taken 3/25/2025 1820 by Jairo Amor RN  Pain Management Interventions: (PRN antiemtic given first for nausea)   cold applied   medication offered but refused     Problem: Comorbidity Management  Goal: Maintenance of Asthma Control  Outcome: Progressing  Goal: Maintenance of Behavioral Health Symptom Control  Outcome: Progressing  Goal: Maintenance of COPD Symptom Control  Outcome: Progressing  Goal: Blood Glucose Levels Within Targeted Range  Outcome: Progressing  Goal: Maintenance of Heart Failure Symptom Control  Outcome: Progressing  Goal: Blood Pressure in Desired Range  Outcome: Progressing  Goal: Maintenance of Osteoarthritis Symptom Control  Outcome: Progressing  Goal: Bariatric Home Regimen Maintained  Outcome: Progressing  Goal: Maintenance of Seizure Control  Outcome: Progressing     Problem: Fall Injury Risk  Goal: Absence of Fall and Fall-Related Injury  Outcome: Progressing  Intervention: Promote Injury-Free Environment  Recent Flowsheet Documentation  Taken 3/25/2025 1820 by Jairo Amor RN  Safety Promotion/Fall Prevention:   activity supervised   assistive device/personal items within reach   clutter free environment maintained   lighting adjusted   nonskid shoes/slippers when out of bed   patient and family education   room near nurse's station   room organization consistent     Problem: Mobility Impairment  Goal: Optimal Mobility  Outcome: Progressing     Problem: Hip Fracture Surgical Repair  Goal: Optimal Coping with Change in Health  Status  Outcome: Progressing  Goal: Absence of Bleeding  Outcome: Progressing  Goal: Effective Bowel Elimination  Outcome: Progressing  Goal: Cognitive Function Maintained  Outcome: Progressing  Goal: Fluid and Electrolyte Balance  Outcome: Progressing  Goal: Absence of Infection Signs and Symptoms  Outcome: Progressing  Goal: Optimal Functional Ability  Outcome: Progressing  Goal: Anesthesia/Sedation Recovery  Outcome: Progressing  Intervention: Optimize Anesthesia Recovery  Recent Flowsheet Documentation  Taken 3/25/2025 1820 by Jairo Amor RN  Safety Promotion/Fall Prevention:   activity supervised   assistive device/personal items within reach   clutter free environment maintained   lighting adjusted   nonskid shoes/slippers when out of bed   patient and family education   room near nurse's station   room organization consistent  Goal: Optimal Pain Control and Function  Outcome: Progressing  Intervention: Prevent or Manage Pain  Recent Flowsheet Documentation  Taken 3/25/2025 1820 by Jairo Amor RN  Pain Management Interventions: (PRN antiemtic given first for nausea)   cold applied   medication offered but refused  Goal: Nausea and Vomiting Relief  Outcome: Progressing  Intervention: Prevent or Manage Nausea and Vomiting  Recent Flowsheet Documentation  Taken 3/25/2025 1820 by Jairo Amor RN  Nausea/Vomiting Interventions:   antiemetic   nausea triggers minimized  Goal: Effective Urinary Elimination  Outcome: Progressing  Goal: Effective Oxygenation and Ventilation  Outcome: Progressing     Problem: Acute Kidney Injury/Impairment  Goal: Fluid and Electrolyte Balance  Outcome: Progressing  Goal: Improved Oral Intake  Outcome: Progressing  Goal: Effective Renal Function  Outcome: Progressing     Problem: Chronic Kidney Disease  Goal: Optimal Coping with Chronic Illness  Outcome: Progressing  Goal: Electrolyte Balance  Outcome: Progressing  Goal: Fluid Balance  Outcome: Progressing  Goal: Optimal  Functional Ability  Outcome: Progressing  Goal: Absence of Anemia Signs and Symptoms  Outcome: Progressing  Goal: Optimal Oral Intake  Outcome: Progressing  Goal: Acceptable Pain Control  Outcome: Progressing  Intervention: Prevent or Manage Pain  Recent Flowsheet Documentation  Taken 3/25/2025 1820 by Jairo Amor RN  Pain Management Interventions: (PRN antiemtic given first for nausea)   cold applied   medication offered but refused  Goal: Minimize Renal Failure Effects  Outcome: Progressing    [facility]  used for education and assessment. Patient lethargic and somnolent for most of the shift. Patient did have left hip pain, but did not want to take PO meds due to nausea. Ice pack in place. PRN IV ondansetron and prochlorperazine given alternating this shift. Patient spiting sputum. Patient did void 50 mL via the Purewick, but had bladder scan of 682 mL. Patient transferred to the bedside commode with an assist of 2 and voided more, but had PVR of ~379 mL. Patient straight cath for ~550 mL at 2230. Patient continued to refuse PO meds at the time. RN educated patient to notify RN if pain is increasing.

## 2025-03-26 NOTE — PLAN OF CARE
Problem: Adult Inpatient Plan of Care  Goal: Plan of Care Review  Description: The Plan of Care Review/Shift note should be completed every shift.  The Outcome Evaluation is a brief statement about your assessment that the patient is improving, declining, or no change.  This information will be displayed automatically on your shiftnote.  Outcome: Progressing   Goal Outcome Evaluation:             Educated pt on treatment plan via [employer] . Pt voiced understanding.      Problem: Adult Inpatient Plan of Care  Goal: Optimal Comfort and Wellbeing  Intervention: Monitor Pain and Promote Comfort  Recent Flowsheet Documentation  Taken 3/26/2025 5032 by Darlene Gomez, RN  Pain Management Interventions:   medication (see MAR)   distraction   emotional support   rest   Pain controlled to LLE with sched. APAP. Pt declined PRN oxycodone.

## 2025-03-26 NOTE — PROGRESS NOTES
"Orthopedic Progress Note      Assessment: 1 Day Post-Op  S/P Procedure(s):  OPEN REDUCTION INTERNAL FIXATION, FRACTURE, LEFT FEMUR, PROXIMAL     Plan:   - Continue PT/OT  - Weightbearing status: WBAT left lower extremity   - Activity: Up with assist and assistive device until independent.  - Anticoagulation: ASA 81mg PO BID in addition to SCDs, nargis stockings and early ambulation.  - Antibiotics: 24 hours perioperative abx  - Pain Management; continue current regimen  - Diet: progress diet as tolerated  - Labs: hgb 9.8, transfuse if <7.0. No indication today  - Dressing: Keep dry and intact  - Follow-up: Outpatient follow up in 2 weeks ror wound check   - Disposition: Anticipate discharge to home vs TCU pending PT/OT eval      Subjective:  Pain: moderate  Nausea, Vomiting: mild nausea, denies vomiting  Lightheadedness, Dizziness:  No  Neuro:  patient complains of mild headache   Fever, chills: No  Chest pain: No  SOB: No    Patient reports feeling well today. She reports pain in the left hip, however is tolerable with current regimen. Patient eating and drinking well. Patient voiding and passing gas however no BM since surgery. All questions/concerns answered.      Objective:  /62 (BP Location: Left arm)   Pulse 100   Temp 97.7  F (36.5  C) (Oral)   Resp 16   Ht 1.6 m (5' 3\")   Wt 54.2 kg (119 lb 7.8 oz)   SpO2 97%   BMI 21.17 kg/m      The patient is A&Ox3. Appears comfortable, sitting up in her bed.  Calves without tenderness, neg Marita's  Brisk capillary refill in the toes.   Palpable Left dorsalis pedis pulse. Left foot warm & well-perfused.  Decreased sensation along lateral left thigh and medial side of her left foot. Remainder of sensation to light touch intact.  ROM: Appropriately flexes & extends all toes bilaterally.   Motor: +5/5 dorsiflexion, plantar flexion & EHL bilaterally.   Leg lengths equal.  Dressing C/D/I without strikethrough, no surrounding erythema.      No drain     Pertinent Labs "   Lab Results: personally reviewed.   Lab Results   Component Value Date    INR 1.07 03/25/2025     Lab Results   Component Value Date    WBC 6.9 03/26/2025    HGB 9.8 (L) 03/26/2025    HCT 26.3 (L) 03/26/2025    MCV 84 03/26/2025     03/26/2025     Lab Results   Component Value Date     03/26/2025    CO2 22 03/26/2025         Report completed by:  Ani Greer PA-C  Date: 03/26/2025  [facility]

## 2025-03-26 NOTE — PROGRESS NOTES
[facility]    Progress Note - Hospitalist Service       Date of Admission:  3/24/2025    Assessment & Plan   Ze Pabon is a 65 year old female admitted on 3/24/2025. She has a history of T2DM, HTN, HLD and is admitted for left hip fx 2/2 mechanical fall and hyperglycemia in the setting of uncontrolled diabetes. She requires orthopedic consultation, pain control, and management of hyperglycemia.      High risk for DVT given fx and recent travel. Will need anticoagulation post operatively      Left hip fx  Mechanical Fall   Patient reported to the ED with left hip pain and difficulty ambulating following a mechanical fall on 3/18. Found to have proximal left femoral fracture with mildly displaced involvement of the greater trochanter and nondisplaced extension into the intertrochanteric femur. ED provider discussed with [facility]. He recommended either surgery vs. At least 6 weeks nonweightbearing. Patient leaning towards surgical option but would like to continue to discuss with family. CT Head & C-spine ordered due to mechanism of fall, no acute findings.   - Orthopedic surgery consulted  Postoperative Plan: Left Femur ORIF  Pain Control: Continue per pain protocol.  Weight Bearing: Weightbearing as tolerated left lower extremity  DVT Prophylaxis: ASA 81 mg BID  Antibiotics: 24 hours of perioperative antibiotics  GI: Plan for aggressive bowel regimen to prevent constipation from narcotic medications  Lines: HLIV once tolerating PO  PT/OT: Eval and treatment. Will follow up on recommendations.  Follow up:  in 2 weeks in clinic for wound check  Discharge plan: to home versus TCU pending progress with PT.     Osteoporosis  Hx fragility fracture  BMD can be done outpatient.  Low Vit D and CKD are risk factors for her, no other laboratory evidence of modifiable causes of osteoporosis.      T2DM  Hyperglycemia    A1c 12.8. Presented with . Patient reports poor  compliance with diabetes medications and hasn't taken anything in almost a year. Has not followed up with provider since 2022. Total daily insulin requirement per weight is 21.6 units. Will require close monitoring of blood sugars and tight control to optimize healing post operatively.   - 10 units Lantus at bedtime  - 3U with meals + MDSSI      CKD vs. RACHEL  Cr 1.46 on admission. 1.52 in 2022. Likely chronic kidney disease secondary to uncontrolled diabetes. Was previously on lisinopril for BP control & kidney protection. Could consider restarting upon discharge. S/p 1L NS bolus in ED.   - daily BMP   - urine culture pending         Diet: Advance Diet as Tolerated: Regular Diet Adult    DVT Prophylaxis: ASA 81 mg BID per ortho  Witt Catheter: Not present  Fluids: po  Lines: None     Cardiac Monitoring: None  Code Status: Full Code      Clinically Significant Risk Factors         # Hyponatremia: Lowest Na = 130 mmol/L in last 2 days, will monitor as appropriate                     # DMII: A1C = 12.8 % (Ref range: <5.7 %) within past 6 months, PRESENT ON ADMISSION             Social [place] of Health   Depression: At risk (3/30/2022)    Received from [employer]    PHQ-2     PHQ-2 Score: 4         Disposition Plan     Medically Ready for Discharge: Anticipated in 2-4 Days       The patient's care was discussed with the Attending Physician, Dr. Renee .    Michael Khan MD  Hospitalist Service  [facility]  Securely message with [employer] (more info)  Text page via [employer] Paging/Directory   ______________________________________________________________________    Interval History     DREAD  Left leg still feels numb from surgery      Physical Exam   Vital Signs: Temp: 97.7  F (36.5  C) Temp src: Oral BP: 133/62 Pulse: 100   Resp: 16 SpO2: 97 % O2 Device: None (Room air) Oxygen Delivery: 2 LPM  Weight: 119 lbs 7.83 oz    GENERAL: healthy, alert and no distress  RESP: lungs clear, speaking  "in full sentences, normal work of breathing   CV: RRR, extremities well perfused  ABDOMEN: soft, nontender  MS: bandage over surgical incision, appears healthy, distal motor function, sensation and perfusion intact left lower extremities  PSYCH: mentation appears normal, affect normal/bright       Data     I have personally reviewed the following data over the past 24 hrs:    6.9  \   9.8 (L)   / 156     135 104 23.0 /  308 (H)   3.9 22 1.11 (H) \     ALT: N/A AST: N/A AP: N/A TBILI: N/A   ALB: N/A TOT PROTEIN: N/A LIPASE: N/A     TSH: N/A T4: N/A A1C: N/A       Imaging results reviewed over the past 24 hrs:   Recent Results (from the past 24 hours)   POC US Guidance Needle Placement    Narrative    Ultrasound was performed as guidance to an anesthesia procedure.  Click   \"PACS images\" hyperlink below to view any stored images.  For specific   procedure details, view procedure note authored by anesthesia.   XR Surgery PILLO Fluoro L/T 5 Min    Narrative    EXAM: XR SURGERY PILLO FLUORO LESS THAN 5 MIN  LOCATION: [facility]  DATE: 3/25/2025    INDICATION: OPEN REDUCTION INTERNAL FIXATION FRACTURE FEMUR PROXIMAL LEFT  COMPARISON: None.    TECHNIQUE: Intraoperative fluoroscopy performed during the patient's procedure.    RADIATION DOSE: Dose Area Product 1.1416 Gy-cm2    FINDINGS: Intraoperative spot images demonstrate interval ORIF of left intertrochanteric fracture with medullary diony and cancellus screw.     "

## 2025-03-26 NOTE — PLAN OF CARE
Pt was drowsy at midnight, however A&Ox4 by 0400. Pt is calm, cooperative, family at bedside.  utilized.   - vitals stable  - IV antibiotics overnight  - blood sugars       - 0000: 261 -> 3 units insulin given       - 0400: 269 -> 3 units insulin given  - pt denies pain at rest, states minimal pain with movement, declines pain medication  - bladder scanned for 269 at 0400, voided at 0455.  - commode at bedside  - denied nausea this shift    -----------------------------------------------------------------------------------      Goal Outcome Evaluation:      Plan of Care Reviewed With: patient    Overall Patient Progress: improving    Outcome Evaluation: Pt tolerating pain after ORIF well, voiding, denies nausea      Problem: Pain Acute  Goal: Optimal Pain Control and Function  Outcome: Progressing  Intervention: Prevent or Manage Pain  Recent Flowsheet Documentation  Taken 3/26/2025 0020 by Anne-Marie Zhu RN  Sensory Stimulation Regulation:   care clustered   lighting decreased   quiet environment promoted  Sleep/Rest Enhancement:   awakenings minimized   family presence promoted   room darkened  Medication Review/Management: medications reviewed     Problem: Comorbidity Management  Goal: Blood Glucose Levels Within Targeted Range  3/26/2025 0531 by Anne-Marie Zhu RN  Outcome: Not Progressing  3/26/2025 0510 by Anne-Marie Zhu RN  Outcome: Progressing  Intervention: Monitor and Manage Glycemia  Recent Flowsheet Documentation  Taken 3/26/2025 0020 by Anne-Marie Zhu RN  Medication Review/Management: medications reviewed     Problem: Fall Injury Risk  Goal: Absence of Fall and Fall-Related Injury  3/26/2025 0531 by Anne-Marie Zhu RN  Outcome: Progressing  3/26/2025 0510 by nAne-Marie Zhu RN  Outcome: Progressing  Intervention: Identify and Manage Contributors  Recent Flowsheet Documentation  Taken 3/26/2025 0020 by Anne-Marie Zhu RN  Medication Review/Management: medications  reviewed  Intervention: Promote Injury-Free Environment  Recent Flowsheet Documentation  Taken 3/26/2025 0020 by Anne-Marie Zhu, RN  Safety Promotion/Fall Prevention:   activity supervised   assistive device/personal items within reach   mobility aid in reach   nonskid shoes/slippers when out of bed   patient and family education   safety round/check completed     Problem: Hip Fracture Surgical Repair  Goal: Effective Bowel Elimination  3/26/2025 0531 by Anne-Marie Zhu, RN  Outcome: Not Progressing  3/26/2025 0510 by Anne-Marie hZu, RN  Outcome: Progressing

## 2025-03-27 ENCOUNTER — APPOINTMENT (OUTPATIENT)
Dept: PHYSICAL THERAPY | Facility: HOSPITAL | Age: 65
DRG: 482 | End: 2025-03-27
Payer: MEDICARE

## 2025-03-27 ENCOUNTER — APPOINTMENT (OUTPATIENT)
Dept: OCCUPATIONAL THERAPY | Facility: HOSPITAL | Age: 65
DRG: 482 | End: 2025-03-27
Payer: MEDICARE

## 2025-03-27 ENCOUNTER — VIRTUAL VISIT (OUTPATIENT)
Dept: INTERPRETER SERVICES | Facility: CLINIC | Age: 65
End: 2025-03-27

## 2025-03-27 ENCOUNTER — VIRTUAL VISIT (OUTPATIENT)
Dept: INTERPRETER SERVICES | Facility: CLINIC | Age: 65
End: 2025-03-27
Payer: MEDICARE

## 2025-03-27 LAB
ANION GAP SERPL CALCULATED.3IONS-SCNC: 10 MMOL/L (ref 7–15)
BUN SERPL-MCNC: 28.5 MG/DL (ref 8–23)
CALCIUM SERPL-MCNC: 9.3 MG/DL (ref 8.8–10.4)
CHLORIDE SERPL-SCNC: 107 MMOL/L (ref 98–107)
CREAT SERPL-MCNC: 1.37 MG/DL (ref 0.51–0.95)
EGFRCR SERPLBLD CKD-EPI 2021: 42 ML/MIN/1.73M2
ERYTHROCYTE [DISTWIDTH] IN BLOOD BY AUTOMATED COUNT: 11.9 % (ref 10–15)
GLUCOSE BLDC GLUCOMTR-MCNC: 148 MG/DL (ref 70–99)
GLUCOSE BLDC GLUCOMTR-MCNC: 196 MG/DL (ref 70–99)
GLUCOSE BLDC GLUCOMTR-MCNC: 252 MG/DL (ref 70–99)
GLUCOSE BLDC GLUCOMTR-MCNC: 342 MG/DL (ref 70–99)
GLUCOSE BLDC GLUCOMTR-MCNC: 396 MG/DL (ref 70–99)
GLUCOSE SERPL-MCNC: 164 MG/DL (ref 70–99)
HCO3 SERPL-SCNC: 23 MMOL/L (ref 22–29)
HCT VFR BLD AUTO: 26.8 % (ref 35–47)
HGB BLD-MCNC: 9.6 G/DL (ref 11.7–15.7)
MCH RBC QN AUTO: 30.8 PG (ref 26.5–33)
MCHC RBC AUTO-ENTMCNC: 35.8 G/DL (ref 31.5–36.5)
MCV RBC AUTO: 86 FL (ref 78–100)
PLATELET # BLD AUTO: 160 10E3/UL (ref 150–450)
POTASSIUM SERPL-SCNC: 3.7 MMOL/L (ref 3.4–5.3)
RBC # BLD AUTO: 3.12 10E6/UL (ref 3.8–5.2)
SODIUM SERPL-SCNC: 140 MMOL/L (ref 135–145)
WBC # BLD AUTO: 7.5 10E3/UL (ref 4–11)

## 2025-03-27 PROCEDURE — 250N000013 HC RX MED GY IP 250 OP 250 PS 637: Performed by: PHYSICIAN ASSISTANT

## 2025-03-27 PROCEDURE — 97530 THERAPEUTIC ACTIVITIES: CPT | Mod: GO

## 2025-03-27 PROCEDURE — 85018 HEMOGLOBIN: CPT | Performed by: STUDENT IN AN ORGANIZED HEALTH CARE EDUCATION/TRAINING PROGRAM

## 2025-03-27 PROCEDURE — 97535 SELF CARE MNGMENT TRAINING: CPT | Mod: GO

## 2025-03-27 PROCEDURE — T1013 SIGN LANG/ORAL INTERPRETER: HCPCS | Mod: U4,TEL,95 | Performed by: INTERPRETER

## 2025-03-27 PROCEDURE — 36415 COLL VENOUS BLD VENIPUNCTURE: CPT | Performed by: STUDENT IN AN ORGANIZED HEALTH CARE EDUCATION/TRAINING PROGRAM

## 2025-03-27 PROCEDURE — 97165 OT EVAL LOW COMPLEX 30 MIN: CPT | Mod: GO

## 2025-03-27 PROCEDURE — 82310 ASSAY OF CALCIUM: CPT | Performed by: STUDENT IN AN ORGANIZED HEALTH CARE EDUCATION/TRAINING PROGRAM

## 2025-03-27 PROCEDURE — T1013 SIGN LANG/ORAL INTERPRETER: HCPCS | Mod: GT,TEL,95

## 2025-03-27 PROCEDURE — 97116 GAIT TRAINING THERAPY: CPT | Mod: GP

## 2025-03-27 PROCEDURE — 120N000001 HC R&B MED SURG/OB

## 2025-03-27 PROCEDURE — 80048 BASIC METABOLIC PNL TOTAL CA: CPT | Performed by: STUDENT IN AN ORGANIZED HEALTH CARE EDUCATION/TRAINING PROGRAM

## 2025-03-27 PROCEDURE — 97110 THERAPEUTIC EXERCISES: CPT | Mod: GP

## 2025-03-27 PROCEDURE — 250N000013 HC RX MED GY IP 250 OP 250 PS 637

## 2025-03-27 PROCEDURE — 250N000013 HC RX MED GY IP 250 OP 250 PS 637: Performed by: STUDENT IN AN ORGANIZED HEALTH CARE EDUCATION/TRAINING PROGRAM

## 2025-03-27 RX ORDER — ACETAMINOPHEN 325 MG/1
975 TABLET ORAL EVERY 8 HOURS
Status: DISCONTINUED | OUTPATIENT
Start: 2025-03-27 | End: 2025-03-28 | Stop reason: HOSPADM

## 2025-03-27 RX ADMIN — SENNOSIDES AND DOCUSATE SODIUM 2 TABLET: 50; 8.6 TABLET ORAL at 22:16

## 2025-03-27 RX ADMIN — ASPIRIN 81 MG: 81 TABLET, COATED ORAL at 22:00

## 2025-03-27 RX ADMIN — Medication 25 MCG: at 08:34

## 2025-03-27 RX ADMIN — ACETAMINOPHEN 975 MG: 325 TABLET ORAL at 17:05

## 2025-03-27 RX ADMIN — ACETAMINOPHEN 975 MG: 325 TABLET ORAL at 10:41

## 2025-03-27 RX ADMIN — ASPIRIN 81 MG: 81 TABLET, COATED ORAL at 08:34

## 2025-03-27 ASSESSMENT — ACTIVITIES OF DAILY LIVING (ADL)
ADLS_ACUITY_SCORE: 49
ADLS_ACUITY_SCORE: 50
ADLS_ACUITY_SCORE: 50
ADLS_ACUITY_SCORE: 56
ADLS_ACUITY_SCORE: 56
ADLS_ACUITY_SCORE: 49
ADLS_ACUITY_SCORE: 50
ADLS_ACUITY_SCORE: 49
ADLS_ACUITY_SCORE: 50
ADLS_ACUITY_SCORE: 56
ADLS_ACUITY_SCORE: 49
ADLS_ACUITY_SCORE: 49
ADLS_ACUITY_SCORE: 50
ADLS_ACUITY_SCORE: 49
ADLS_ACUITY_SCORE: 56
ADLS_ACUITY_SCORE: 56
ADLS_ACUITY_SCORE: 49
ADLS_ACUITY_SCORE: 56
ADLS_ACUITY_SCORE: 56
ADLS_ACUITY_SCORE: 49
ADLS_ACUITY_SCORE: 56

## 2025-03-27 NOTE — PLAN OF CARE
Problem: Adult Inpatient Plan of Care  Goal: Optimal Comfort and Wellbeing  Intervention: Monitor Pain and Promote Comfort  Recent Flowsheet Documentation  Taken 3/26/2025 1730 by Orquidea Gutierrez RN  Pain Management Interventions: medication (see MAR)  Taken 3/26/2025 1609 by Orquidea Gutierrez RN  Pain Management Interventions: medication (see MAR)  Intervention: Provide Person-Centered Care  Recent Flowsheet Documentation  Taken 3/26/2025 1609 by Orquidea Gutierrez RN  Trust Relationship/Rapport:   care explained   choices provided   questions answered   questions encouraged   thoughts/feelings acknowledged    Problem: Mobility Impairment  Goal: Optimal Mobility  Intervention: Optimize Mobility  Recent Flowsheet Documentation  Taken 3/26/2025 2221 by Orquidea Gutierrez RN  Positioning/Transfer Devices:   pillows   in use     Problem: Comorbidity Management  Goal: Blood Glucose Levels Within Targeted Range  Intervention: Monitor and Manage Glycemia  Recent Flowsheet Documentation  Taken 3/26/2025 1609 by Orquidea Gutierrez RN  Medication Review/Management: medications reviewed   Problem: Pain Acute  Goal: Optimal Pain Control and Function  Intervention: Prevent or Manage Pain  Recent Flowsheet Documentation  Taken 3/26/2025 1609 by Orquidea Gutierrez RN  Sensory Stimulation Regulation: care clustered  Medication Review/Management: medications reviewed   Goal Outcome Evaluation:  Patient  spent a fair shift. Alert and oriented x 4. Hmong speaking, with minimal English. Patient was up on the commode to void x 1. Voided 350 ml. Post void residue checked was 24 ml. Patient requested to have external catheter at bedtime. PureWick in place at bedtime. Blood sugars were 290 and 253 this shift. Insulin coverage per sliding scale and per schedule. Patient rated her pain on the left hip at 5/10. Pain and discomfort managed with scheduled tylenol. Patient rated her pain at 3/10 on reassessment. No complaint of  nausea/vomiting.

## 2025-03-27 NOTE — PROGRESS NOTES
Physical Therapy        03/26/25 1048   Appointment Info   Signing Clinician's Name / Credentials (PT) Kay Bell, PT, DPT   Living Environment   People in Home spouse;child(rizwan), adult   Current Living Arrangements house   Home Accessibility stairs to enter home;stairs within home   Number of Stairs, Main Entrance 1   Number of Stairs, Within Home, Primary four   Stair Railings, Within Home, Primary railings safe and in good condition   Self-Care   Equipment Currently Used at Home none   Activity/Exercise/Self-Care Comment pt independent at baseline   General Information   Onset of Illness/Injury or Date of Surgery 03/24/25   Referring Physician Pierre Aparicio MD   Patient/Family Therapy Goals Statement (PT) none stated   Pertinent History of Current Problem (include personal factors and/or comorbidities that impact the POC) left hip fx, s/p ORIF   Existing Precautions/Restrictions fall   Weight-Bearing Status - LLE weight-bearing as tolerated   Cognition   Affect/Mental Status (Cognition) WFL;flat/blunted affect   Pain Assessment   Patient Currently in Pain No   Integumentary/Edema   Integumentary/Edema Comments dressing C/D/I   Posture    Posture Not impaired   Range of Motion (ROM)   Range of Motion ROM deficits secondary to surgical procedure   Strength (Manual Muscle Testing)   Strength (Manual Muscle Testing) Deficits observed during functional mobility   Bed Mobility   Comment, (Bed Mobility) Jamin supine to sit   Transfers   Comment, (Transfers) Jamin sit to stand   Gait/Stairs (Locomotion)   Comment, (Gait/Stairs) 3' with FWW, Jamin   Balance   Balance Comments decreased 2/2 surgical procedure   Sensory Examination   Sensory Perception Comments some numbness on anteromedial thigh L   Clinical Impression   Criteria for Skilled Therapeutic Intervention Yes, treatment indicated   PT Diagnosis (PT) difficulty walking   Influenced by the following impairments pain, weakness   Functional limitations due to  impairments limited household mobility   Clinical Presentation (PT Evaluation Complexity) stable   Clinical Presentation Rationale presents as medically diagnosed   Clinical Decision Making (Complexity) moderate complexity   Planned Therapy Interventions (PT) balance training;bed mobility training;gait training;home exercise program;neuromuscular re-education;patient/family education;ROM (range of motion);stair training;strengthening;transfer training;progressive activity/exercise   Risk & Benefits of therapy have been explained evaluation/treatment results reviewed;current/potential barriers reviewed;participants voiced agreement with care plan;participants included;patient   PT Total Evaluation Time   PT Eval, Moderate Complexity Minutes (58366) 10   Physical Therapy Goals   PT Predicted Duration/Target Date for Goal Attainment 04/02/25   PT Goals Bed Mobility;Transfers;Gait;Stairs   Interventions   Interventions Quick Adds Therapeutic Activity;Therapeutic Procedure   Therapeutic Procedure/Exercise   Treatment Detail/Skilled Intervention 10 ankle pumps   Therapeutic Activity   Therapeutic Activities: dynamic activities to improve functional performance Minutes (37626) 10   Treatment Detail/Skilled Intervention transfer training: CGA additional sit<>stand from bed, chair. VCs for safe technique with FWW, hand placement, controlled descent, fully reaching destination before initiating sit   PT Discharge Planning   PT Plan GT, hip HEP   PT Discharge Recommendation (DC Rec) home with home care physical therapy;home with assist   PT Rationale for DC Rec not ready to today, but anticipate in another day or two she will be safe to d/c home with PT and OT   PT Brief overview of current status good strength and pain control, excellent rehab potential   PT Total Distance Amb During Session (feet) 3   Physical Therapy Time and Intention   Timed Code Treatment Minutes 10   Total Session Time (sum of timed and untimed  services) 20         Kay Bell, DPT 3/26/2025

## 2025-03-27 NOTE — PROGRESS NOTES
03/27/25 1100   Appointment Info   Signing Clinician's Name / Credentials (OT) Sydnie Duff, OTR/L       Present yes   Language Hmong   Living Environment   People in Home spouse;child(rizwan), adult;grandchild(rizwan)   Current Living Arrangements house   Living Environment Comments bedroom and shower upstairs, tub shower   Self-Care   Equipment Currently Used at Home none   Activity/Exercise/Self-Care Comment IND for BADLs   Instrumental Activities of Daily Living (IADL)   IADL Comments shared with family, doesn't drive   General Information   Onset of Illness/Injury or Date of Surgery 03/24/25   Referring Physician Ani Greer PA-C   Patient/Family Therapy Goal Statement (OT) go home   Additional Occupational Profile Info/Pertinent History of Current Problem admit following fall, now s/p L prox femur ORIF   Existing Precautions/Restrictions fall;weight bearing   Left Lower Extremity (Weight-bearing Status) weight-bearing as tolerated (WBAT)   Cognitive Status Examination   Orientation Status orientation to person, place and time   Affect/Mental Status (Cognitive) WFL   Follows Commands WFL   Pain Assessment   Patient Currently in Pain Yes, see Vital Sign flowsheet   Range of Motion Comprehensive   General Range of Motion bilateral upper extremity ROM WFL   Strength Comprehensive (MMT)   Comment, General Manual Muscle Testing (MMT) Assessment WFL   Transfers   Transfers sit-stand transfer;toilet transfer   Sit-Stand Transfer   Sit-Stand [place] (Transfers) contact guard   Toilet Transfer   Type (Toilet Transfer) sit-stand;stand-sit   [place] Level (Toilet Transfer) contact guard   Toilet Transfer Comments per clinical judgement   Balance   Balance Assessment standing static balance   Standing Balance: Static contact guard   Position/Device Used, Standing Balance walker, front-wheeled   Activities of Daily Living   BADL Assessment/Intervention lower body  dressing;toileting;grooming   Lower Body Dressing Assessment/Training   [place] Level (Lower Body Dressing) moderate assist (50% patient effort)   Grooming Assessment/Training   Position (Grooming) supported sitting   [place] Level (Grooming) oral care regimen;set up   Toileting   [place] Level (Toileting) minimum assist (75% patient effort)   Clinical Impression   Criteria for Skilled Therapeutic Interventions Met (OT) Yes, treatment indicated   OT Diagnosis dec BADL IND   OT Problem List-Impairments impacting ADL problems related to;activity tolerance impaired;mobility;strength;pain;post-surgical precautions   Assessment of Occupational Performance 5 or more Performance Deficits   Identified Performance Deficits dressing, toileting, bathing, household mobility, functional transfers, household management, meal prep, community mobility   Planned Therapy Interventions (OT) ADL retraining;bed mobility training;balance training;transfer training;home program guidelines;progressive activity/exercise   Clinical Decision Making Complexity (OT) problem focused assessment/low complexity   Risk & Benefits of therapy have been explained evaluation/treatment results reviewed;participants included;patient   OT Total Evaluation Time   OT Eval, Low Complexity Minutes (84837) 10   OT Goals   Therapy Frequency (OT) Daily   OT Predicted Duration/Target Date for Goal Attainment 04/03/25   OT Goals Lower Body Dressing;Toilet Transfer/Toileting;Transfers   OT: Lower Body Dressing Supervision/stand-by assist;using adaptive equipment   OT: Transfer Supervision/stand-by assist  (tub)   OT: Toilet Transfer/Toileting Supervision/stand-by assist;toilet transfer;cleaning and garment management   Self-Care/Home Management   Self-Care/Home Mgmt/ADL, Compensatory, Meal Prep Minutes (14769) 9   Symptoms Noted During/After Treatment (Meal Preparation/Planning Training) increased pain   Treatment Detail/Skilled Intervention Cues for  positioning at sink for increase support and safety with oral cares. With cues, close SBA for static standing at sink for oral cares ~5 min. Discussed considerations for bathing safety: consideration of non-slip strips vs sponge bathing d/t pt's concerns of slippery floor.   Therapeutic Activities   Therapeutic Activity Minutes (03222) 11   Symptoms noted during/after treatment increased pain   Treatment Detail/Skilled Intervention Min cues for FWW positioning and hand placement to reduce pain with LLE weightbearing. With cues, CGA with FWW for 7ftx2 for to/from sink and x3 reps sit <> stand from recliner. Following, provided demo with educ on FWW positioning and safety considerations.   OT Discharge Planning   OT Plan tub transfer, LB dressing - may need AE, toilet transfer, car transfer   OT Discharge Recommendation (DC Rec) home with assist;home with home care occupational therapy   OT Rationale for DC Rec anticipate pt to meet goals to allow for safe discharge home with family's support. recommend home OT to maximize I/ADL IND.   OT Brief overview of current status CGA mobility, min/mod ADLs   OT Total Distance Amb During Session (feet) 14   OT Equipment Needed at Discharge tub bench;raised toilet seat;reacher   Total Session Time   Timed Code Treatment Minutes 20   Total Session Time (sum of timed and untimed services) 30

## 2025-03-27 NOTE — PROGRESS NOTES
Care Management Follow Up    Length of Stay (days): 3    Expected Discharge Date: 03/28/2025     Concerns to be Addressed: discharge planning     Patient plan of care discussed at interdisciplinary rounds: Yes    Anticipated Discharge Disposition:  home with family and home care              Anticipated Discharge Services:  home care  Anticipated Discharge DME:      Patient/family educated on Medicare website which has current facility and service quality ratings:    Education Provided on the Discharge Plan:    Patient/Family in Agreement with the Plan:      Referrals Placed by CM/SW:    Private pay costs discussed: {IP CM Discharge Costs Discussed:459742}    Discussed  Partnership in Safe Discharge Planning  document with patient/family: {YES/NO:60}     Handoff Completed: {IP CM Handoff Referral list:[id number]}    Additional Information:  ***    Next Steps: ***    Deann Raman RN

## 2025-03-27 NOTE — PROGRESS NOTES
"Orthopedic Progress Note      Assessment: 2 Days Post-Op  S/P Procedure(s):  OPEN REDUCTION INTERNAL FIXATION, FRACTURE, LEFT FEMUR, PROXIMAL     Plan:   - Continue PT/OT  - Weightbearing status: WBAT left lower extremity   - Activity: Up with assist and assistive device until independent.  - Anticoagulation: ASA 81mg PO BID in addition to SCDs, nargis stockings and early ambulation.  - Antibiotics: 24 hours perioperative abx  - Pain Management; continue current regimen  - Diet: progress diet as tolerated  - Labs: hgb 9.6, transfuse if <7.0. No indication today  - Dressing: Keep dry and intact  - Follow-up: Outpatient follow up in 2 weeks for wound check   - Disposition: Anticipate discharge to home pending progress with therapy.       Subjective:  Pain: controlled with current regimen  Denies nausea/vomiting  Lightheadedness, Dizziness:  No  Fever, chills: No  Chest pain: No  SOB: No    Patient reports feeling well today, she is up in the chair. She continues to report pain in the left hip, however is tolerable with current regimen. Patient eating and drinking well. All questions/concerns answered.      Objective:  BP (!) 144/72 (BP Location: Left arm)   Pulse 90   Temp 97.8  F (36.6  C) (Oral)   Resp 16   Ht 1.6 m (5' 3\")   Wt 54.2 kg (119 lb 7.8 oz)   SpO2 98%   BMI 21.17 kg/m      The patient is A&Ox3. Appears comfortable, sitting up in her chair  Calves without tenderness, neg Marita's  Brisk capillary refill in the toes.   Palpable Left dorsalis pedis pulse. Left foot warm & well-perfused.  Improved sensation along lateral left thigh. Mild decreased sensation below the left knee. Remainder of sensation to light touch intact.  ROM: Appropriately flexes & extends all toes bilaterally.   Motor: +5/5 dorsiflexion, plantar flexion & EHL bilaterally.   Leg lengths equal.  Dressing C/D/I without strikethrough, no surrounding erythema.      No drain     Pertinent Labs   Lab Results: personally reviewed.   Lab " Results   Component Value Date    INR 1.07 03/25/2025     Lab Results   Component Value Date    WBC 7.5 03/27/2025    HGB 9.6 (L) 03/27/2025    HCT 26.8 (L) 03/27/2025    MCV 86 03/27/2025     03/27/2025     Lab Results   Component Value Date     03/27/2025    CO2 23 03/27/2025         Report completed by:  Ani Greer PA-C  Date: 03/27/2025  [facility]

## 2025-03-27 NOTE — PROGRESS NOTES
[facility]    Progress Note - Hospitalist Service       Date of Admission:  3/24/2025    Assessment & Plan   Ze Pabon is a 65 year old female admitted on 3/24/2025. She has a history of T2DM, HTN, HLD and is admitted for left hip fx 2/2 mechanical fall and hyperglycemia in the setting of uncontrolled diabetes. She requires orthopedic consultation, pain control, and management of hyperglycemia.      Left hip fx  Mechanical Fall   Patient reported to the ED with left hip pain and difficulty ambulating following a mechanical fall on 3/18. Found to have proximal left femoral fracture with mildly displaced involvement of the greater trochanter and nondisplaced extension into the intertrochanteric femur. ED provider discussed with Dr. Smith [facility]. He recommended either surgery vs. At least 6 weeks nonweightbearing. Patient leaning towards surgical option but would like to continue to discuss with family. CT Head & C-spine ordered due to mechanism of fall, no acute findings.   - Orthopedic surgery consulted  - Continue PT/OT  - Weightbearing status: WBAT left lower extremity   - Activity: Up with assist and assistive device until independent.  - Anticoagulation: ASA 81mg PO BID in addition to SCDs, nargis stockings and early ambulation.  - Antibiotics: 24 hours perioperative abx  - Pain Management; continue current regimen  - Diet: progress diet as tolerated  - Labs: hgb 9.6, transfuse if <7.0. No indication today  - Dressing: Keep dry and intact  - Follow-up: Outpatient follow up in 2 weeks for wound check   - Disposition: Anticipate discharge to home pending progress with therapy.      Osteoporosis  Hx fragility fracture  BMD can be done outpatient.  Low Vit D and CKD are risk factors for her, no other laboratory evidence of modifiable causes of osteoporosis.   - Vit D started inpatient  - will replete Vit D prior to medical therapy    T2DM  Hyperglycemia    A1c 12.8. Presented with  . Patient reports poor compliance with diabetes medications and hasn't taken anything in almost a year. Has not followed up with provider since 2022. Total daily insulin requirement per weight is 21.6 units. Will require close monitoring of blood sugars and tight control to optimize healing post operatively.   - 10 units Lantus at bedtime  - 3U with meals + MDSSI      CKD vs. RACHEL  Cr 1.46 on admission. 1.52 in 2022. Likely chronic kidney disease secondary to uncontrolled diabetes. Was previously on lisinopril for BP control & kidney protection. Could consider restarting upon discharge. S/p 1L NS bolus in ED.   - daily BMP   - urine culture pending         Diet: Discharge Instruction - Regular Diet Adult  Moderate Consistent Carb (60 g CHO per Meal) Diet    DVT Prophylaxis: ASA  Witt Catheter: Not present  Fluids: po  Lines: None     Cardiac Monitoring: None  Code Status: Full Code      Clinically Significant Risk Factors                             # DMII: A1C = 12.8 % (Ref range: <5.7 %) within past 6 months, PRESENT ON ADMISSION             Social Drivers of Health   Depression: At risk (3/30/2022)    Received from "[facility]"    PHQ-2     PHQ-2 Score: 4         Disposition Plan     Medically Ready for Discharge: Anticipated Tomorrow       The patient's care was discussed with the Attending Physician, Dr. Rosenstein .    Michael Khan MD  Hospitalist Service  [facility]  Securely message with [employer]. (more info)  Text page via [employer] Paging/Directory   ______________________________________________________________________    Interval History     DREAD  Pain controlled  Needs further work with PT prior to discharging    Physical Exam   Vital Signs: Temp: 97.8  F (36.6  C) Temp src: Oral BP: (!) 144/72 Pulse: 90   Resp: 16 SpO2: 98 % O2 Device: None (Room air)    Weight: 119 lbs 7.83 oz    GENERAL: healthy, alert and no distress  RESP: lungs clear, speaking in full sentences,  normal work of breathing   CV: RRR, extremities well perfused  ABDOMEN: soft, nontender  MS: bandage over surgical incision, appears healthy  PSYCH: mentation appears normal, affect normal/bright       Data     I have personally reviewed the following data over the past 24 hrs:    7.5  \   9.6 (L)   / 160     140 107 28.5 (H) /  148 (H)   3.7 23 1.37 (H) \       Imaging results reviewed over the past 24 hrs:   No results found for this or any previous visit (from the past 24 hours).

## 2025-03-27 NOTE — PLAN OF CARE
Pt is A&Ox4, calm, cooperative. Hmong speaking. Spouse at bedside.  - vitals stable on room air  - states minimal pain to left hip, 1/10. Dressings c/d/I  - voiding well with purewick   - denies nausea this shift  - slept well overnight    ------------------------------------------------------------------------------------------    Goal Outcome Evaluation:      Plan of Care Reviewed With: patient    Overall Patient Progress: improving    Outcome Evaluation: Pt tolerating pain after ORIF well, voiding, denies nausea      Problem: Comorbidity Management  Goal: Blood Glucose Levels Within Targeted Range  Outcome: Not Progressing  Intervention: Monitor and Manage Glycemia  Recent Flowsheet Documentation  Taken 3/27/2025 0015 by Anne-Marie Zhu RN  Medication Review/Management: medications reviewed     Problem: Comorbidity Management  Goal: Blood Pressure in Desired Range  Outcome: Progressing  Intervention: Maintain Blood Pressure Management  Recent Flowsheet Documentation  Taken 3/27/2025 0015 by Anne-Marie Zhu RN  Medication Review/Management: medications reviewed     Problem: Hip Fracture Surgical Repair  Goal: Effective Bowel Elimination  Outcome: Not Progressing     Problem: Pain Acute  Goal: Optimal Pain Control and Function  Outcome: Progressing  Intervention: Develop Pain Management Plan  Recent Flowsheet Documentation  Taken 3/27/2025 0015 by Anne-Marie Zhu RN  Pain Management Interventions:   pillow support provided   repositioned  Intervention: Prevent or Manage Pain  Recent Flowsheet Documentation  Taken 3/27/2025 0015 by Anne-Marie Zhu RN  Sensory Stimulation Regulation:   care clustered   lighting decreased   quiet environment promoted  Sleep/Rest Enhancement:   awakenings minimized   family presence promoted   room darkened  Medication Review/Management: medications reviewed

## 2025-03-27 NOTE — PLAN OF CARE
Problem: Adult Inpatient Plan of Care  Goal: Plan of Care Review  Description: The Plan of Care Review/Shift note should be completed every shift.  The Outcome Evaluation is a brief statement about your assessment that the patient is improving, declining, or no change.  This information will be displayed automatically on your shiftnote.  Outcome: Progressing   Goal Outcome Evaluation:             Educated pt via Divided , pt voiced understanding.      Problem: Pain Acute  Goal: Optimal Pain Control and Function  Outcome: Progressing  Intervention: Develop Pain Management Plan  Recent Flowsheet Documentation  Taken 3/27/2025 5741 by Darlene Gomez, RN  Pain Management Interventions:   declines   distraction   emotional support     Pain to RLE controlled with sched. APAP. Denies nausea.

## 2025-03-28 ENCOUNTER — APPOINTMENT (OUTPATIENT)
Dept: PHYSICAL THERAPY | Facility: HOSPITAL | Age: 65
DRG: 482 | End: 2025-03-28
Payer: MEDICARE

## 2025-03-28 ENCOUNTER — APPOINTMENT (OUTPATIENT)
Dept: OCCUPATIONAL THERAPY | Facility: HOSPITAL | Age: 65
DRG: 482 | End: 2025-03-28
Payer: MEDICARE

## 2025-03-28 VITALS
OXYGEN SATURATION: 96 % | HEART RATE: 88 BPM | HEIGHT: 63 IN | TEMPERATURE: 98.1 F | WEIGHT: 119.49 LBS | BODY MASS INDEX: 21.17 KG/M2 | SYSTOLIC BLOOD PRESSURE: 143 MMHG | DIASTOLIC BLOOD PRESSURE: 67 MMHG | RESPIRATION RATE: 18 BRPM

## 2025-03-28 VITALS
TEMPERATURE: 97.9 F | HEIGHT: 63 IN | HEART RATE: 83 BPM | WEIGHT: 119.49 LBS | RESPIRATION RATE: 20 BRPM | BODY MASS INDEX: 21.17 KG/M2 | SYSTOLIC BLOOD PRESSURE: 143 MMHG | DIASTOLIC BLOOD PRESSURE: 70 MMHG | OXYGEN SATURATION: 100 %

## 2025-03-28 LAB
ANION GAP SERPL CALCULATED.3IONS-SCNC: 6 MMOL/L (ref 7–15)
BUN SERPL-MCNC: 32.6 MG/DL (ref 8–23)
CALCIUM SERPL-MCNC: 9 MG/DL (ref 8.8–10.4)
CHLORIDE SERPL-SCNC: 108 MMOL/L (ref 98–107)
CREAT SERPL-MCNC: 1.34 MG/DL (ref 0.51–0.95)
EGFRCR SERPLBLD CKD-EPI 2021: 44 ML/MIN/1.73M2
ERYTHROCYTE [DISTWIDTH] IN BLOOD BY AUTOMATED COUNT: 11.7 % (ref 10–15)
GLUCOSE BLDC GLUCOMTR-MCNC: 169 MG/DL (ref 70–99)
GLUCOSE BLDC GLUCOMTR-MCNC: 217 MG/DL (ref 70–99)
GLUCOSE BLDC GLUCOMTR-MCNC: 233 MG/DL (ref 70–99)
GLUCOSE SERPL-MCNC: 181 MG/DL (ref 70–99)
HCO3 SERPL-SCNC: 24 MMOL/L (ref 22–29)
HCT VFR BLD AUTO: 25.4 % (ref 35–47)
HGB BLD-MCNC: 9.3 G/DL (ref 11.7–15.7)
MCH RBC QN AUTO: 31.4 PG (ref 26.5–33)
MCHC RBC AUTO-ENTMCNC: 36.6 G/DL (ref 31.5–36.5)
MCV RBC AUTO: 86 FL (ref 78–100)
PLATELET # BLD AUTO: 169 10E3/UL (ref 150–450)
POTASSIUM SERPL-SCNC: 3.9 MMOL/L (ref 3.4–5.3)
RBC # BLD AUTO: 2.96 10E6/UL (ref 3.8–5.2)
SODIUM SERPL-SCNC: 138 MMOL/L (ref 135–145)
WBC # BLD AUTO: 6.6 10E3/UL (ref 4–11)

## 2025-03-28 PROCEDURE — 80048 BASIC METABOLIC PNL TOTAL CA: CPT | Performed by: STUDENT IN AN ORGANIZED HEALTH CARE EDUCATION/TRAINING PROGRAM

## 2025-03-28 PROCEDURE — 250N000013 HC RX MED GY IP 250 OP 250 PS 637

## 2025-03-28 PROCEDURE — 97535 SELF CARE MNGMENT TRAINING: CPT | Mod: GO

## 2025-03-28 PROCEDURE — 99238 HOSP IP/OBS DSCHRG MGMT 30/<: CPT | Mod: GC

## 2025-03-28 PROCEDURE — 250N000013 HC RX MED GY IP 250 OP 250 PS 637: Performed by: PHYSICIAN ASSISTANT

## 2025-03-28 PROCEDURE — 36415 COLL VENOUS BLD VENIPUNCTURE: CPT | Performed by: STUDENT IN AN ORGANIZED HEALTH CARE EDUCATION/TRAINING PROGRAM

## 2025-03-28 PROCEDURE — 85018 HEMOGLOBIN: CPT | Performed by: STUDENT IN AN ORGANIZED HEALTH CARE EDUCATION/TRAINING PROGRAM

## 2025-03-28 PROCEDURE — 97116 GAIT TRAINING THERAPY: CPT | Mod: GP

## 2025-03-28 PROCEDURE — 97530 THERAPEUTIC ACTIVITIES: CPT | Mod: GO

## 2025-03-28 PROCEDURE — 97110 THERAPEUTIC EXERCISES: CPT | Mod: GP

## 2025-03-28 PROCEDURE — 250N000013 HC RX MED GY IP 250 OP 250 PS 637: Performed by: STUDENT IN AN ORGANIZED HEALTH CARE EDUCATION/TRAINING PROGRAM

## 2025-03-28 RX ORDER — VITAMIN B COMPLEX
25 TABLET ORAL DAILY
Qty: 90 TABLET | Refills: 0 | Status: SHIPPED | OUTPATIENT
Start: 2025-03-29

## 2025-03-28 RX ORDER — NICOTINE POLACRILEX 4 MG
15-30 LOZENGE BUCCAL
Qty: 12.5 ML | Refills: 0 | Status: SHIPPED | OUTPATIENT
Start: 2025-03-28

## 2025-03-28 RX ADMIN — SENNOSIDES AND DOCUSATE SODIUM 1 TABLET: 50; 8.6 TABLET ORAL at 09:34

## 2025-03-28 RX ADMIN — ASPIRIN 81 MG: 81 TABLET, COATED ORAL at 09:23

## 2025-03-28 RX ADMIN — ACETAMINOPHEN 975 MG: 325 TABLET ORAL at 01:58

## 2025-03-28 RX ADMIN — Medication 25 MCG: at 09:29

## 2025-03-28 RX ADMIN — ACETAMINOPHEN 975 MG: 325 TABLET ORAL at 09:23

## 2025-03-28 ASSESSMENT — ACTIVITIES OF DAILY LIVING (ADL)
ADLS_ACUITY_SCORE: 57

## 2025-03-28 NOTE — PLAN OF CARE
Physical Therapy Discharge Summary    Reason for therapy discharge:    Discharged to home with home therapy.    Progress towards therapy goal(s). See goals on Care Plan in [facility] electronic health record for goal details.  Goals partially met.  Barriers to achieving goals:   Pt was working on the goals.    Therapy recommendation(s):    Continued therapy is recommended.  Rationale/Recommendations:  To improve mobility and strength. .

## 2025-03-28 NOTE — PROGRESS NOTES
"Orthopedic Progress Note      Assessment: 3 Days Post-Op  S/P Procedure(s):  OPEN REDUCTION INTERNAL FIXATION, FRACTURE, LEFT FEMUR, PROXIMAL     Plan:   - Continue PT/OT  - Weightbearing status: WBAT left lower extremity   - Activity: Up with assist and assistive device until independent.  - Anticoagulation: ASA 81mg PO BID in addition to SCDs, nargis stockings and early ambulation.  - Antibiotics: 24 hours perioperative abx  - Pain Management; continue current regimen  - Diet: progress diet as tolerated  - Labs: hgb 9.3, transfuse if <7.0. No indication today  - Dressing: Keep dry and intact  - Follow-up: Outpatient follow up in 2 weeks for wound check   - Disposition: Anticipate discharge to home with home care.       Subjective:  Pain: controlled with current regimen  Denies nausea/vomiting  Lightheadedness, Dizziness:  No  Fever, chills: No  Chest pain: No  SOB: No    Patient reports feeling well today, she is up and ambulating with OT. She states the pain is improving and sensation is back to normal in the left leg. Patient eating and drinking well. Has not had a BM but is passing some gas. All questions/concerns answered.      Objective:  BP (!) 143/70 (BP Location: Right arm)   Pulse 83   Temp 97.9  F (36.6  C) (Oral)   Resp 20   Ht 1.6 m (5' 3\")   Wt 54.2 kg (119 lb 7.8 oz)   SpO2 100%   BMI 21.17 kg/m      The patient is A&Ox3. Appears comfortable, standing up with OT  Calves without tenderness, neg Marita's  Brisk capillary refill in the toes.   Palpable Left dorsalis pedis pulse. Left foot warm & well-perfused.  Sensation to light touch intact.  ROM: Appropriately flexes & extends all toes bilaterally.   Motor: +5/5 dorsiflexion, plantar flexion & EHL bilaterally.   Leg lengths equal.  Dressing C/D/I without strikethrough, no surrounding erythema.      No drain     Pertinent Labs   Lab Results: personally reviewed.   Lab Results   Component Value Date    INR 1.07 03/25/2025     Lab Results   Component " Value Date    WBC 6.6 03/28/2025    HGB 9.3 (L) 03/28/2025    HCT 25.4 (L) 03/28/2025    MCV 86 03/28/2025     03/28/2025     Lab Results   Component Value Date     03/28/2025    CO2 24 03/28/2025         Report completed by:  Ani Greer PA-C  Date: 03/28/2025  [facility]

## 2025-03-28 NOTE — PROGRESS NOTES
Care Management Discharge Note    Discharge Date: 03/28/2025       Discharge Disposition: Home Care    Discharge Services: Home Care    Discharge DME: None    Discharge Transportation: family or friend will provide    Private pay costs discussed: Not applicable    Does the patient's insurance plan have a 3 day qualifying hospital stay waiver?  No    PAS Confirmation Code: NA  Patient/family educated on Medicare website which has current facility and service quality ratings:      Education Provided on the Discharge Plan: Yes  Persons Notified of Discharge Plans: patient, , home care, RN, MD  Patient/Family in Agreement with the Plan: yes    Handoff Referral Completed: No, handoff not indicated or clinically appropriate    Additional Information:  CM informed patient and  of needing to use her personal medical insurance as the claim is still under investigation with [employer].  Patient and  expressed understanding.     Patient discharging home with home care RN, PT, OT, [facility] accepted.    All parties updated.    Deann Raman RN

## 2025-03-28 NOTE — DISCHARGE SUMMARY
[facility]  Discharge Summary - Medicine & Pediatrics       Date of Admission:  3/24/2025  Date of Discharge:  3/28/2025  Discharging Provider: Dr Khan  Discharge Service: Hospitalist Service    Discharge Diagnoses   Left hip fx  Mechanical Fall   Osteoporosis  Hx fragility fracture  T2DM  Hyperglycemia    CKD stage 3    Clinically Significant Risk Factors     # DMII: A1C = 12.8 % (Ref range: <5.7 %) within past 6 months       Follow-ups Needed After Discharge   Follow-up Appointments       Follow Up Care      Please follow-up with Dr. Aparicio' team in 2 weeks at [facility]. Call our scheduling line at [phone] to make an appointment, if you do not already have one scheduled.        Hospital Follow-up with Existing Primary Care Provider (PCP)      Please see details below         Schedule Primary Care visit within: 7 Days           {Additional important follow-up instructions/to-do's for PCP         Follow-up uncontrolled diabetes  RACHEL vs CKD  Recommend BMP  Follow-up fragility fracture  Recommend DEXA, consider medical therapy      Discharge Disposition   Discharged to home  Condition at discharge: Stable    Hospital Course   Ze Pabon is a 65 year old female admitted on 3/24/2025. She has a history of T2DM, HTN, HLD and is admitted for left hip fx 2/2 mechanical fall and hyperglycemia in the setting of uncontrolled diabetes. She requires orthopedic consultation, pain control, and management of hyperglycemia.      The following problems were addressed during her hospitalization:    Left hip fx  Mechanical Fall   S/p ORIF left hip  Patient reported to the ED with left hip pain and difficulty ambulating following a mechanical fall on 3/18. Found to have proximal left femoral fracture with mildly displaced involvement of the greater trochanter and nondisplaced extension into the intertrochanteric femur. ED provider discussed with Dr. Smith, [facility]. He recommended either  surgery vs. At least 6 weeks nonweightbearing. Patient leaning towards surgical option but would like to continue to discuss with family. CT Head & C-spine ordered due to mechanism of fall, no acute findings.   - Orthopedic surgery consulted, s/p ORIF left hip  - Continue PT/OT  - Weightbearing status: WBAT left lower extremity   - Activity: Up with assist and assistive device until independent.  - Anticoagulation: ASA 81mg PO BID in addition to SCDs, nargis stockings and early ambulation.  - Antibiotics: 24 hours perioperative abx  - Pain Management; continue current regimen  - Diet: progress diet as tolerated  - Labs: hgb 9.6, transfuse if <7.0. No indication today  - Dressing: Keep dry and intact  - Follow-up: Outpatient follow up in 2 weeks for wound check   - Disposition: Anticipate discharge to home pending progress with therapy.      Osteoporosis  Hx fragility fracture  BMD can be done outpatient.  Low Vit D and CKD are risk factors for her, no other laboratory evidence of modifiable causes of osteoporosis.   - Vit D started inpatient  - will replete Vit D prior to medical therapy     T2DM  Hyperglycemia    A1c 12.8. Presented with . Patient reports poor compliance with diabetes medications and hasn't taken anything in almost a year. Has not followed up with provider since 2022. Total daily insulin requirement per weight is 21.6 units. Will require close monitoring of blood sugars and tight control to optimize healing post operatively.   - 10 units Lantus at bedtime  - 3U with meals + MDSSI      CKD vs. RACHEL  Cr 1.46 on admission. 1.52 in 2022. Likely chronic kidney disease secondary to uncontrolled diabetes. Was previously on lisinopril for BP control & kidney protection. Could consider restarting upon discharge. S/p 1L NS bolus in ED.   - daily BMP   - urine culture pending     Consultations This Hospital Stay   ORTHOPEDIC SURGERY IP CONSULT  PHYSICAL THERAPY ADULT IP CONSULT  CARE MANAGEMENT / SOCIAL  "WORK IP CONSULT  OCCUPATIONAL THERAPY ADULT IP CONSULT  CARE MANAGEMENT / SOCIAL WORK IP CONSULT    Code Status   Full Code       The patient was discussed with Dr. Nba Khan MD    ______________________________________________________________________    Leg feels less numb, pain controlled  Has been doing some ambulating in the halls    Physical Exam   Vital Signs: Temp: 97.9  F (36.6  C) Temp src: Oral BP: (!) 143/70 Pulse: 83   Resp: 20 SpO2: 100 % O2 Device: None (Room air)    Weight: 119 lbs 7.83 oz    GENERAL: healthy, alert and no distress  RESP: lungs clear, speaking in full sentences, normal work of breathing   CV: RRR, extremities well perfused  ABDOMEN: soft, nontender  MS: bandage over surgical incision, appears healthy  PSYCH: mentation appears normal, affect normal/bright       Primary Care Physician   PATSY EDWARDS    Discharge Orders      Home Care Referral      Reason for your hospital stay    Left femur fracture     When to call - Contact Surgeon Team    You may experience symptoms that require follow-up before your scheduled appointment. Refer to the \"Stoplight Tool\" for instructions on when to contact your Surgeon Team if you are concerned about pain control, blood clots, constipation, or if you are unable to urinate.     When to call - Reach out to Urgent Care    If you are not able to reach your Surgeon Team and you need immediate care, go to the Orthopedic Walk-in Clinic or Urgent Care at your Surgeon's office.  Do NOT go to the Emergency Room unless you have shortness of breath, chest pain, or other signs of a medical emergency.     When to call - Reasons to Call 911    Call 911 immediately if you experience sudden-onset chest pain, arm weakness/numbness, slurred speech, or shortness of breath     Discharge Instruction - Breathing exercises    Perform breathing exercises 10 times per hours while awake for 2 weeks. (If given, use your Incentive Spirometer)     Symptoms " - Fever Management    A low grade fever can be expected after surgery.  Use acetaminophen (TYLENOL) as needed for fever management.  Contact your Surgeon Team if you have a fever greater than 101.5 F, chills, and/or night sweats.     Symptoms - Constipation management    Constipation (hard, dry bowel movements) is expected after surgery due to the combination of being less active, the anesthetic, and the opioid pain medication.  You can do the following to help reduce constipation:  ~  FLUIDS:  Drink clear liquids (water or Gatorade), or juice (apple/prune).  ~  DIET:  Eat a fiber rich diet.    ~  ACTIVITY:  Get up and move around several times a day.  Increase your activity as you are able.  MEDICATIONS:  Reduce the risk of constipation by starting medications before you are constipated.  You can take Miralax   (1 packet as directed) and/or a stool softener (Senokot 1-2 tablets 1-2 times a day).  If you already have constipation and these medications are not working, you can get magnesium citrate and use as directed.  If you continue to have constipation you can try an over the counter suppository or enema.  Call your Surgeon Team if it has been greater than 3 days since your last bowel movement.     Symptoms - Reduced Urine Output    Changes in the amount of fluids you drank before and after surgery may result in problems urinating.  It is important to stay well-hydrated after surgery and drink plenty of water. If it has been greater than 8 hours since you have urinated despite drinking plenty of water, call your Surgeon Team.     Activity - Exercises to prevent blood clots    Unless otherwise directed by your Surgeon team, perform the following exercises at least three times per day for the first four weeks after surgery to prevent blood clots in your legs: 1) Point and flex your feet (Ankle Pumps), 2) Move your ankle around in big circles, 3) Wiggle your toes, 4) Walk, even for short distances, several times a  day, will help decrease the risk of blood clots.     Comfort and Pain Management - Pain after Surgery    Pain after surgery is normal and expected.  You will have some amount of pain for several weeks after surgery.  Your pain will improve with time.  There are several things you can do to help reduce your pain including: rest, ice, elevation, and using pain medications as needed. Contact your Surgeon Team if you have pain that persists or worsens after surgery despite rest, ice, elevation, and taking your medication(s) as prescribed. Contact your Surgeon Team if you have new numbness, tingling, or weakness in your operative extremity.     Comfort and Pain Management - Swelling after Surgery    Swelling and/or bruising of the surgical extremity is common and may persist for several months after surgery. In addition to frequent icing and elevation, gentle compressive support with an ACE wrap or tubigrip may help with swelling. Apply compression regularly, removing at least twice daily to perform skin checks. Contact your Surgeon Team if your swelling increases and is NOT associated with an increase in your activity level, or if your swelling increases and is associated with redness and pain.     Comfort and Pain Management - Cold therapy    Ice can be used to control swelling and discomfort after surgery. Place a thin towel over your operative site and apply the ice pack overtop. Leave ice pack in place for 20 minutes, then remove for 20 minutes. Repeat this 20 minutes on/20 minutes off routine as often as tolerated.     Medication Instructions - Acetaminophen (TYLENOL) Instructions    You were discharged with acetaminophen (TYLENOL) for pain management after surgery. Acetaminophen most effectively manages pain symptoms when it is taken on a schedule without missing doses (every four, six, or eight hours). Your Provider will prescribe a safe daily dose between 3000 - 4000 mg.  Do NOT exceed this daily dose. Most  "patients use acetaminophen for pain control for the first four weeks after surgery.  You can wean from this medication as your pain decreases.     Medication Instructions - NSAID Instructions    You were discharged with an anti-inflammatory medication for pain management to use in combination with acetaminophen (TYLENOL) and the narcotic pain medication.  Take this medication exactly as directed.  You should only take one anti-inflammatory at a time.  Some common anti-inflammatories include: ibuprofen (ADVIL, MOTRIN), naproxen (ALEVE, NAPROSYN), celecoxib (CELEBREX), meloxicam (MOBIC), ketorolac (TORADOL).  Take this medication with food and water.     Follow Up Care    Please follow-up with Dr. Aparicio' team in 2 weeks at [facility]. Call our scheduling line at [phone] to make an appointment, if you do not already have one scheduled.     Medication instructions -  Anticoagulation - aspirin    Take the aspirin as prescribed for a total of four weeks after surgery.  This is given to help minimize your risk of blood clot.     Comfort and Pain Management - LOWER Extremity Elevation    Swelling is expected for several months after surgery. This type of swelling is usually associated with gravity and activity, and can be improved with elevation.   The best way to do this is to get your \"toes above your nose\" by laying down and placing several pillows lengthwise under your calf and heel. When elevating your leg keep your knee completely straight. Perform this elevation as often as possible especially for the first two weeks after surgery.     Opioid Instructions (Greater than or equal to 65 years)    You were discharged with an opioid medication (hydromorphone, oxycodone, hydrocodone, or tramadol). This medication should only be taken for breakthrough pain that is not controlled with acetaminophen (TYLENOL). If you rate your pain less than 3 you do not need this medication. Pain rating 0-3: You do not need " this medication Pain rating 4-6: Take 1/2 tablet every 4-6 hours as needed Pain rating 7-10: Take 1 tablet every 4-6 hours as needed Do not exceed 4 tablets per day     Medication Instructions - Opioids - Tapering Instructions    In the first three days following surgery, your symptoms may warrant use of the narcotic pain medication every four to six hours as prescribed. This is normal. As your pain symptoms improve, focus your efforts on decreasing (tapering) use of narcotic medications. The most successful tapering strategy is to first, decrease the number of tablets you take every 4-6 hours to the minimum prescribed. Then, increase the amount of time between doses. For example: First, taper to   or 1 tablet every 4-6 hours. Then, taper to   or 1 tablet every 6-8 hours. Then, taper to   or 1 tablet every 8-10 hours. Then, taper to   or 1 tablet every 10-12 hours. Then, taper to   or 1 tablet at bedtime. The bedtime dose can help with comfort during sleep and is typically the last dose to be discontinued after surgery.     Return to Driving    Return to driving - Driving is NOT permitted until directed by your provider. Under no circumstance are you permitted to drive while using narcotic pain medications.     Dressing / Wound Care - Wound    You have a clean dressing on your surgical wound. Dressing change instructions as follows: dressing will be removed at your follow-up appointment. Contact your Surgeon Team if you have increased redness, warmth around the surgical wound, and/or drainage from the surgical wound.     Dressing / Wound Care - NO Tub Bathing    Tub bathing, swimming, or any other activities that will cause your incision to be submerged in water should be avoided until allowed by your Surgeon.     No precautions    No precautions directed by your Provider.  You may perform range of motion activities as tolerated.     Weight bearing as tolerated    Weight bearing as tolerated on your operative  extremity.     Dressing / Wound care - Shower with wound/dressing covered    You must COVER your dressing or incision with saran wrap (or any other non-permeable covering) to allow the incision to remain dry while showering.  You may shower 2 days after surgery as long as the surgical wound stays dry. Continue to cover your dressing or incision for showering until your first office visit.  You are strictly prohibited from soaking or submerging the surgical wound underwater.     Activity     Reason for your hospital stay    You were hospitalized after a fall and found to have a hip fracture.     Activity    Your activity upon discharge: activity as tolerated     Walker Order     Discharge Instruction - Regular Diet Adult    Return to your pre-surgery diet unless instructed otherwise     Diet    Follow this diet upon discharge: Current Diet:Orders Placed This Encounter      Discharge Instruction - Regular Diet Adult      Moderate Consistent Carb (60 g CHO per Meal) Diet     Hospital Follow-up with Existing Primary Care Provider (PCP)    Please see details below            Significant Results and Procedures   Results for orders placed or performed during the hospital encounter of 03/24/25   Head CT w/o contrast    Narrative    EXAM: CT HEAD W/O CONTRAST, CT CERVICAL SPINE W/O CONTRAST  LOCATION: [facility]  DATE: 3/24/2025    INDICATION: Fall  COMPARISON: None   TECHNIQUE:   1) Routine CT Head without IV contrast. Multiplanar reformats. Dose reduction techniques were used.  2) Routine CT Cervical Spine without IV contrast. Multiplanar reformats. Dose reduction techniques were used.    FINDINGS:   HEAD CT:   INTRACRANIAL CONTENTS: No acute intracranial hemorrhage, extraaxial fluid collection, or mass effect. No evidence of an acute transcortical confluent infarct. Mild presumed chronic small vessel ischemic changes. Mild-moderate generalized cerebral and   mild cerebellar parenchymal volume  loss. No hydrocephalus.    VISUALIZED ORBITS/SINUSES/MASTOIDS: No acute intraorbital finding. No significant paranasal sinus or mastoid mucosal disease.    BONES/SOFT TISSUES: No acute calvarial injury or significant scalp hematoma.    CERVICAL SPINE CT:   VERTEBRA: Diffuse osseous demineralization. No acute displaced fracture, traumatic listhesis, or compression deformity. Straightening of the normal cervical lordosis with 1-2 mm likely degenerative retrolisthesis at C4-C5. Subchondral cystic change   involving the inferior C4 and superior C5 endplates, greater at the former where there is vacuum phenomenon. Vacuum phenomena are also seen in the ventral epidural space at C3 inferior endplate level and C6 vertebral level. Moderate intervertebral disc   height loss at C4-C5, mild-moderate at C5-C6, and mild at C3-C4. Mild scattered facet arthrosis. Curvilinear periodontal ligamentous calcifications, likely CPPD related.    CANAL/FORAMINA: Posterior disc-osteophyte complexes most notably at C4-C5 where a prominent central/left central disc-osteophyte complex contributes to mild spinal canal stenosis. Multilevel foraminal narrowing, worst and moderate at C4-C5 bilaterally.    EXTRASPINAL: No prevertebral edema. Clear visualized lungs.      Impression    IMPRESSION:  HEAD CT:  1.  No acute intracranial abnormality.  2.  Chronic aged-related intracranial changes.    CERVICAL SPINE CT:  1.  No CT evidence for acute fracture or post traumatic subluxation.  2.  Diffuse osseous demineralization and multilevel spondylosis, as described.   CT Cervical Spine w/o Contrast    Narrative    EXAM: CT HEAD W/O CONTRAST, CT CERVICAL SPINE W/O CONTRAST  LOCATION: [facility]  DATE: 3/24/2025    INDICATION: Fall  COMPARISON: None   TECHNIQUE:   1) Routine CT Head without IV contrast. Multiplanar reformats. Dose reduction techniques were used.  2) Routine CT Cervical Spine without IV contrast. Multiplanar  reformats. Dose reduction techniques were used.    FINDINGS:   HEAD CT:   INTRACRANIAL CONTENTS: No acute intracranial hemorrhage, extraaxial fluid collection, or mass effect. No evidence of an acute transcortical confluent infarct. Mild presumed chronic small vessel ischemic changes. Mild-moderate generalized cerebral and   mild cerebellar parenchymal volume loss. No hydrocephalus.    VISUALIZED ORBITS/SINUSES/MASTOIDS: No acute intraorbital finding. No significant paranasal sinus or mastoid mucosal disease.    BONES/SOFT TISSUES: No acute calvarial injury or significant scalp hematoma.    CERVICAL SPINE CT:   VERTEBRA: Diffuse osseous demineralization. No acute displaced fracture, traumatic listhesis, or compression deformity. Straightening of the normal cervical lordosis with 1-2 mm likely degenerative retrolisthesis at C4-C5. Subchondral cystic change   involving the inferior C4 and superior C5 endplates, greater at the former where there is vacuum phenomenon. Vacuum phenomena are also seen in the ventral epidural space at C3 inferior endplate level and C6 vertebral level. Moderate intervertebral disc   height loss at C4-C5, mild-moderate at C5-C6, and mild at C3-C4. Mild scattered facet arthrosis. Curvilinear periodontal ligamentous calcifications, likely CPPD related.    CANAL/FORAMINA: Posterior disc-osteophyte complexes most notably at C4-C5 where a prominent central/left central disc-osteophyte complex contributes to mild spinal canal stenosis. Multilevel foraminal narrowing, worst and moderate at C4-C5 bilaterally.    EXTRASPINAL: No prevertebral edema. Clear visualized lungs.      Impression    IMPRESSION:  HEAD CT:  1.  No acute intracranial abnormality.  2.  Chronic aged-related intracranial changes.    CERVICAL SPINE CT:  1.  No CT evidence for acute fracture or post traumatic subluxation.  2.  Diffuse osseous demineralization and multilevel spondylosis, as described.   CT Hip Left w/o Contrast     Narrative    EXAM: CT HIP LEFT W/O CONTRAST  LOCATION: [facility]  DATE: 3/24/2025    INDICATION: Fall, pain.   COMPARISON: None available.   TECHNIQUE: Noncontrast. Axial, sagittal and coronal thin-section reconstruction. Dose reduction techniques were used.     FINDINGS:     BONES:  -Diffuse osseous demineralization. Mildly displaced fracture through the base of the greater trochanter. No definite intertrochanteric extension of the fracture, however, there is increased density of the intramedullary canal within the intertrochanteric   region which may reflect edema and possibly an occult fracture. Mild degenerative arthrosis of the left hip and the pubic symphysis.     SOFT TISSUES:  -Soft tissue edema involving the lateral proximal thigh.  -Scattered arterial vascular calcifications.       Impression    IMPRESSION:  1.  Mildly displaced fracture through the base of the greater trochanter. No definite intertrochanteric extension, however, there is increased density of the intramedullary canal in the intertrochanteric region which may reflect edema and possibly an   occult fracture. MRI would be more sensitive for evaluation if clinically indicated.  2.  Mild degenerative arthrosis of the left hip.    NOTE: ABNORMAL REPORT    THE DICTATION ABOVE DESCRIBES AN ABNORMALITY FOR WHICH FOLLOW-UP IS NEEDED.       Chest XR,  PA & LAT    Narrative    EXAM: XR CHEST 2 VIEWS  LOCATION: [facility]  DATE: 3/24/2025    INDICATION: left rib pain after fall  COMPARISON: 12/09/2020      Impression    IMPRESSION: Stable chest with no acute cardiopulmonary or musculoskeletal abnormalities. Mildly calcified thoracic aortic arch. Slight thoracolumbar spinal curvature.   MR Hip Left w/o Contrast    Narrative    EXAM: MR HIP LEFT W/O CONTRAST  LOCATION: [facility]  DATE: 3/24/2025    INDICATION: further evaluate fracture  COMPARISON: CT 03/24/2025  TECHNIQUE:  "Unenhanced.    FINDINGS:    LEFT HIP:   -Labrum: Degeneration of the anterosuperior labrum.   -Cartilage: Likely grade II chondromalacia of the left hip. No subchondral edema.  -Joint space: Small joint effusion.   -Joint capsule/ligaments: Intact joint capsule. Ligamentum teres is attenuated.    MUSCLES AND TENDONS:   -Gluteal: Gluteus minimus tendon is intact. The greater trochanter anterior facet is nondisplaced. There is likely reactive edema within the tendon. No significant muscular atrophy. There is mild displacement of the lateral and superior posterior facets   of the greater trochanter with associated small tears of the gluteus medius tendon. No focal muscular atrophy.  -Proximal hamstring: Mild proximal hamstring tendinopathy. No significant tear.   -Iliopsoas: No tendon tear or tendinopathy. No significant bursitis.  -Rectus femoris origin: No tear or tendinopathy.    BONES:   -There is a fracture of the proximal femur involving the greater trochanter which is mildly displaced superiorly. There is nondisplaced extension into the intertrochanteric femur (series 3 and 4 image 9 or series 5 and 6 image 12). The lesser trochanter   is spared.   -Mild left sacroiliac joint arthrosis. There is likely reactive edema in the left sacral ala.    SOFT TISSUES:   -Marked edema about the proximal femoral fracture with nonorganized fluid/blood products extending into the adjacent musculature.    INTRA-PELVIC CONTENTS:  -Visualized portions are normal.      Impression    IMPRESSION:  1.  Proximal left femoral fracture with mildly displaced involvement of the greater trochanter and nondisplaced extension into the intertrochanteric femur. The lesser trochanter spared.  2.  Mild underlying left hip osteoarthritis with a small joint effusion.     POC US Guidance Needle Placement    Narrative    Ultrasound was performed as guidance to an anesthesia procedure.  Click   \"PACS images\" hyperlink below to view any stored images. "  For specific   procedure details, view procedure note authored by anesthesia.   XR Surgery PILLO Fluoro L/T 5 Min    Narrative    EXAM: XR SURGERY PILLO FLUORO LESS THAN 5 MIN  LOCATION: [facility]  DATE: 3/25/2025    INDICATION: OPEN REDUCTION INTERNAL FIXATION FRACTURE FEMUR PROXIMAL LEFT  COMPARISON: None.    TECHNIQUE: Intraoperative fluoroscopy performed during the patient's procedure.    RADIATION DOSE: Dose Area Product 1.1416 Gy-cm2    FINDINGS: Intraoperative spot images demonstrate interval ORIF of left intertrochanteric fracture with medullary diony and cancellus screw.       Discharge Medications   Current Discharge Medication List        START taking these medications    Details   aspirin 81 MG EC tablet Take 1 tablet (81 mg) by mouth 2 times daily.  Qty: 60 tablet, Refills: 0    Associated Diagnoses: Closed nondisplaced intertrochanteric fracture of left femur, initial encounter (H)      blood glucose (NO BRAND SPECIFIED) lancets standard Use to test blood sugar 3 times daily or as directed.  Qty: 100 each, Refills: 11    Associated Diagnoses: Uncontrolled type 2 diabetes mellitus with hyperglycemia (H)      blood glucose (NO BRAND SPECIFIED) test strip Use to test blood sugar 3 times daily or as directed.  Qty: 100 strip, Refills: 11    Associated Diagnoses: Uncontrolled type 2 diabetes mellitus with hyperglycemia (H)      blood glucose monitoring (NO BRAND SPECIFIED) meter device kit Use to test blood sugar 3 times daily or as directed.  Qty: 1 kit, Refills: 2    Associated Diagnoses: Uncontrolled type 2 diabetes mellitus with hyperglycemia (H)      glucose 40 % (400 mg/mL) gel Take 15-30 g by mouth every 15 minutes as needed for low blood sugar.  Qty: 12.5 mL, Refills: 0    Associated Diagnoses: Uncontrolled type 2 diabetes mellitus with hyperglycemia (H)      insulin glargine (LANTUS PEN) 100 UNIT/ML pen Inject 10 Units subcutaneously at bedtime.  Qty: 15 mL, Refills: 3     Comments: If Lantus is not covered by insurance, may substitute Basaglar or Semglee or other insulin glargine product per insurance preference at same dose and frequency.    Associated Diagnoses: Uncontrolled type 2 diabetes mellitus with hyperglycemia (H)      metFORMIN (GLUCOPHAGE) 500 MG tablet Take 1 tablet (500 mg) by mouth 2 times daily (with meals).  Qty: 90 tablet, Refills: 0    Associated Diagnoses: Uncontrolled type 2 diabetes mellitus with hyperglycemia (H)      senna-docusate (SENOKOT-S/PERICOLACE) 8.6-50 MG tablet Take 1 tablet by mouth 2 times daily as needed for constipation.  Qty: 30 tablet, Refills: 0    Associated Diagnoses: Closed nondisplaced intertrochanteric fracture of left femur, initial encounter (H)      Vitamin D3 (CHOLECALCIFEROL) 25 mcg (1000 units) tablet Take 1 tablet (25 mcg) by mouth daily.  Qty: 90 tablet, Refills: 0    Associated Diagnoses: Age-related osteoporosis with current pathological fracture, initial encounter           CONTINUE these medications which have CHANGED    Details   acetaminophen (TYLENOL) 325 MG tablet Take 3 tablets (975 mg) by mouth every 8 hours.  Qty: 100 tablet, Refills: 0    Associated Diagnoses: Closed nondisplaced intertrochanteric fracture of left femur, initial encounter (H)           STOP taking these medications       ibuprofen (ADVIL/MOTRIN) 200 MG tablet Comments:   Reason for Stopping:             Allergies   No Known Allergies

## 2025-03-28 NOTE — CONSULTS
"Care Management Follow Up    Length of Stay (days): 4    Expected Discharge Date: 03/28/2025     Concerns to be Addressed: discharge planning     Patient plan of care discussed at interdisciplinary rounds: Yes    Anticipated Discharge Disposition:  home              Anticipated Discharge Services:  home care?  Anticipated Discharge DME:  rolling walker    Patient/family educated on Medicare website which has current facility and service quality ratings:    Education Provided on the Discharge Plan:    Patient/Family in Agreement with the Plan:      Referrals Placed by CM/SW:    Private pay costs discussed: Not applicable    Discussed  Partnership in Safe Discharge Planning  document with patient/family: No     Handoff Completed: No, handoff not indicated or clinically appropriate    Additional Information:  Social Hx: \"Patient lives with her , son, daughter in law and 4 grandchildren. Patient reports she is independent at baseline. Uses a cane if needed. Her  is home with her most of the time.\"    Patient had a fall at [facility] and hospital encounter is being [facility] to [employer] [facility] [facility].    [facility]  Romana: [phone]  Claim #: [id number]    CM left voicemail for Romana requesting assistance with claim and getting home care set up.    CM met with patient, obtained claim number.  Patient confirms she would like home care.   CM sent home care referral to [facility] home care requesting PT/OT.  Sent insurance claim information.      Romana from [employer] called back, reports there is no medical payment coverage, and her claim is still under investigation.  Her hospital stay and after care will need to be billed to her personal insurance.     CM alerted [facility] liaison of above.    CM notified registration of above, they will add her personal insurance to her hospital encounter.    CM will discuss above with patient.      Next Steps: Find accepting home " care agency.  PT to issue walker.    Deann Raman RN

## 2025-03-28 NOTE — PLAN OF CARE
"  Problem: Adult Inpatient Plan of Care  Goal: Plan of Care Review  Description: The Plan of Care Review/Shift note should be completed every shift.  The Outcome Evaluation is a brief statement about your assessment that the patient is improving, declining, or no change.  This information will be displayed automatically on your shiftnote.  Outcome: Progressing  Goal: Patient-Specific Goal (Individualized)  Description: You can add care plan individualizations to a care plan. Examples of Individualization might be:  \"Parent requests to be called daily at 9am for status\", \"I have a hard time hearing out of my right ear\", or \"Do not touch me to wake me up as it startlesme\".  Outcome: Progressing  Goal: Absence of Hospital-Acquired Illness or Injury  Outcome: Progressing  Intervention: Identify and Manage Fall Risk  Recent Flowsheet Documentation  Taken 3/27/2025 1549 by Milly Coronado RN  Safety Promotion/Fall Prevention:   activity supervised   mobility aid in reach   nonskid shoes/slippers when out of bed  Intervention: Prevent Skin Injury  Recent Flowsheet Documentation  Taken 3/27/2025 2221 by Milly Coronado RN  Body Position: position changed independently  Taken 3/27/2025 2021 by Milly Coronado RN  Body Position: position changed independently  Taken 3/27/2025 1821 by Milly Coronado RN  Body Position: position changed independently  Taken 3/27/2025 1621 by Milly Coronado RN  Body Position:   position changed independently   weight shifting  Taken 3/27/2025 1549 by Milly Coronado RN  Body Position: sitting up in bed  Intervention: Prevent and Manage VTE (Venous Thromboembolism) Risk  Recent Flowsheet Documentation  Taken 3/27/2025 1549 by Milly Coronado RN  VTE Prevention/Management: SCDs on (sequential compression devices)  Intervention: Prevent Infection  Recent Flowsheet Documentation  Taken 3/27/2025 1549 by Milly Coronado RN  Infection Prevention: hand hygiene promoted  Goal: Optimal Comfort and Wellbeing  Outcome: " Progressing  Intervention: Monitor Pain and Promote Comfort  Recent Flowsheet Documentation  Taken 3/27/2025 1549 by Milly Coronado RN  Pain Management Interventions:   emotional support   pillow support provided   quiet environment facilitated   repositioned  Intervention: Provide Person-Centered Care  Recent Flowsheet Documentation  Taken 3/27/2025 1549 by Milly Coronado RN  Trust Relationship/Rapport:   emotional support provided   thoughts/feelings acknowledged   questions answered   questions encouraged  Goal: Readiness for Transition of Care  Outcome: Progressing  Flowsheets (Taken 3/27/2025 2251)  Transportation Anticipated: family or friend will provide  Intervention: Mutually Develop Transition Plan  Recent Flowsheet Documentation  Taken 3/27/2025 2251 by Milly Coronado RN  Transportation Anticipated: family or friend will provide  Patient/Family Anticipated Services at Transition: none  Patient/Family Anticipates Transition to: home with family  Equipment Currently Used at Home: none     Problem: Delirium  Goal: Optimal Coping  Outcome: Progressing  Intervention: Optimize Psychosocial Adjustment to Delirium  Recent Flowsheet Documentation  Taken 3/27/2025 1549 by Milly Coronado RN  Family/Support System Care: support provided  Goal: Improved Behavioral Control  Outcome: Progressing  Intervention: Prevent and Manage Agitation  Recent Flowsheet Documentation  Taken 3/27/2025 1549 by Milly Coronado RN  Environment Familiarity/Consistency: daily routine followed  Intervention: Minimize Safety Risk  Recent Flowsheet Documentation  Taken 3/27/2025 1549 by Milly Coronado RN  Enhanced Safety Measures: pain management  Trust Relationship/Rapport:   emotional support provided   thoughts/feelings acknowledged   questions answered   questions encouraged  Goal: Improved Attention and Thought Clarity  Outcome: Progressing  Intervention: Maximize Cognitive Function  Recent Flowsheet Documentation  Taken 3/27/2025 1549 by Milly Coronado RN  Sensory  Stimulation Regulation:   care clustered   quiet environment promoted  Reorientation Measures:   calendar in view   clock in view  Goal: Improved Sleep  Outcome: Progressing     Problem: Pain Acute  Goal: Optimal Pain Control and Function  Outcome: Progressing  Intervention: Develop Pain Management Plan  Recent Flowsheet Documentation  Taken 3/27/2025 1549 by Milly Coronado RN  Pain Management Interventions:   emotional support   pillow support provided   quiet environment facilitated   repositioned  Intervention: Prevent or Manage Pain  Recent Flowsheet Documentation  Taken 3/27/2025 1549 by Milly Coronado RN  Sensory Stimulation Regulation:   care clustered   quiet environment promoted  Medication Review/Management: medications reviewed     Problem: Comorbidity Management  Goal: Maintenance of Asthma Control  Outcome: Progressing  Intervention: Maintain Asthma Symptom Control  Recent Flowsheet Documentation  Taken 3/27/2025 1549 by Milly Coronado RN  Medication Review/Management: medications reviewed  Goal: Maintenance of Behavioral Health Symptom Control  Outcome: Progressing  Intervention: Maintain Behavioral Health Symptom Control  Recent Flowsheet Documentation  Taken 3/27/2025 1549 by Milly Coronado RN  Medication Review/Management: medications reviewed  Goal: Maintenance of COPD Symptom Control  Outcome: Progressing  Intervention: Maintain COPD Symptom Control  Recent Flowsheet Documentation  Taken 3/27/2025 1549 by Milly Coronado RN  Medication Review/Management: medications reviewed     Problem: Fall Injury Risk  Goal: Absence of Fall and Fall-Related Injury  Outcome: Progressing  Intervention: Identify and Manage Contributors  Recent Flowsheet Documentation  Taken 3/27/2025 1549 by Milly Coronado RN  Medication Review/Management: medications reviewed  Intervention: Promote Injury-Free Environment  Recent Flowsheet Documentation  Taken 3/27/2025 1549 by Milly Coronado RN  Safety Promotion/Fall Prevention:   activity supervised    mobility aid in reach   nonskid shoes/slippers when out of bed     Problem: Mobility Impairment  Goal: Optimal Mobility  Outcome: Progressing  Intervention: Optimize Mobility  Recent Flowsheet Documentation  Taken 3/27/2025 2221 by Milly Coronado RN  Assistive Device Utilized:   gait belt   walker  Activity Management:   back to bed   up to bedside commode  Positioning/Transfer Devices:   pillows   in use  Taken 3/27/2025 2021 by Milly Coronado RN  Positioning/Transfer Devices:   pillows   in use  Taken 3/27/2025 1821 by Milly Coronado RN  Positioning/Transfer Devices:   pillows   in use  Taken 3/27/2025 1621 by Milly Coronado RN  Assistive Device Utilized:   gait belt   walker  Activity Management:   back to bed   up to bedside commode  Positioning/Transfer Devices:   pillows   in use  Taken 3/27/2025 1549 by Milly Coronado RN  Activity Management:   activity adjusted per tolerance   activity encouraged     Problem: Hip Fracture Surgical Repair  Goal: Optimal Coping with Change in Health Status  Outcome: Progressing  Intervention: Support Psychosocial Response to Surgery and Mobility Changes  Recent Flowsheet Documentation  Taken 3/27/2025 1549 by Milly Coronado RN  Family/Support System Care: support provided  Goal: Effective Urinary Elimination  Outcome: Progressing  Goal: Effective Oxygenation and Ventilation  Outcome: Progressing  Intervention: Optimize Oxygenation and Ventilation  Recent Flowsheet Documentation  Taken 3/27/2025 2221 by Milly Coronado RN  Head of Bed (HOB) Positioning: HOB at 30-45 degrees  Taken 3/27/2025 2021 by Milly Coronado RN  Head of Bed (HOB) Positioning: HOB at 20-30 degrees  Taken 3/27/2025 1821 by Milly Coronado RN  Head of Bed (HOB) Positioning: HOB at 20-30 degrees  Taken 3/27/2025 1621 by Milly Coronado RN  Head of Bed (HOB) Positioning: HOB at 20-30 degrees     Problem: Acute Kidney Injury/Impairment  Goal: Improved Oral Intake  Outcome: Progressing     Problem: Chronic Kidney Disease  Goal: Optimal Functional  Ability  Outcome: Progressing  Intervention: Optimize Functional Ability  Recent Flowsheet Documentation  Taken 3/27/2025 2221 by Milly Coronado RN  Activity Management:   back to bed   up to bedside commode  Taken 3/27/2025 1621 by Milly Coronado RN  Activity Management:   back to bed   up to bedside commode  Taken 3/27/2025 1549 by Milly Coronado RN  Activity Management:   activity adjusted per tolerance   activity encouraged  Environment Familiarity/Consistency: daily routine followed  Goal: Optimal Oral Intake  Outcome: Progressing  Goal: Acceptable Pain Control  Outcome: Progressing  Intervention: Prevent or Manage Pain  Recent Flowsheet Documentation  Taken 3/27/2025 1549 by Milly Coronado RN  Pain Management Interventions:   emotional support   pillow support provided   quiet environment facilitated   repositioned     Goal Outcome Evaluation:       Pt alert and oriented X4. Pt denies pain. Scheduled meds given. Pt compliant. Pt is able to make needs known. Pt is assist of one, walker and gait-belt. Pt voiding, up to bedside commode and no BM this shift. PRN senna given, pt reports having constipation. Spouse at bedside overnight.     Milly Coronado RN

## 2025-03-28 NOTE — PLAN OF CARE
"  Problem: Adult Inpatient Plan of Care  Goal: Plan of Care Review  Description: The Plan of Care Review/Shift note should be completed every shift.  The Outcome Evaluation is a brief statement about your assessment that the patient is improving, declining, or no change.  This information will be displayed automatically on your shiftnote.  Outcome: Met  Goal: Patient-Specific Goal (Individualized)  Description: You can add care plan individualizations to a care plan. Examples of Individualization might be:  \"Parent requests to be called daily at 9am for status\", \"I have a hard time hearing out of my right ear\", or \"Do not touch me to wake me up as it startlesme\".  Outcome: Met  Goal: Absence of Hospital-Acquired Illness or Injury  Outcome: Met  Intervention: Identify and Manage Fall Risk  Recent Flowsheet Documentation  Taken 3/28/2025 0923 by Christina Satrk RN  Safety Promotion/Fall Prevention:   activity supervised   mobility aid in reach   nonskid shoes/slippers when out of bed   lighting adjusted   clutter free environment maintained   patient and family education   safety round/check completed  Intervention: Prevent Skin Injury  Recent Flowsheet Documentation  Taken 3/28/2025 0923 by Christina Stark RN  Body Position: position changed independently  Intervention: Prevent and Manage VTE (Venous Thromboembolism) Risk  Recent Flowsheet Documentation  Taken 3/28/2025 0923 by Crhistina Stark RN  VTE Prevention/Management: SCDs on (sequential compression devices)  Intervention: Prevent Infection  Recent Flowsheet Documentation  Taken 3/28/2025 0923 by Christina Stark RN  Infection Prevention:   hand hygiene promoted   rest/sleep promoted   single patient room provided  Goal: Optimal Comfort and Wellbeing  Outcome: Met  Intervention: Monitor Pain and Promote Comfort  Recent Flowsheet Documentation  Taken 3/28/2025 0923 by Christina Stark RN  Pain Management Interventions: medication (see MAR)  Intervention: Provide " Person-Centered Care  Recent Flowsheet Documentation  Taken 3/28/2025 0923 by Christina Stark RN  Trust Relationship/Rapport:   thoughts/feelings acknowledged   questions answered   emotional support provided   care explained   choices provided  Goal: Readiness for Transition of Care  Outcome: Met     Problem: Delirium  Goal: Optimal Coping  Outcome: Met  Intervention: Optimize Psychosocial Adjustment to Delirium  Recent Flowsheet Documentation  Taken 3/28/2025 0923 by Christina Stark RN  Family/Support System Care: support provided  Goal: Improved Behavioral Control  Outcome: Met  Intervention: Prevent and Manage Agitation  Recent Flowsheet Documentation  Taken 3/28/2025 0923 by Christina Stark RN  Environment Familiarity/Consistency: daily routine followed  Intervention: Minimize Safety Risk  Recent Flowsheet Documentation  Taken 3/28/2025 0923 by Christina Stark RN  Enhanced Safety Measures:   assistive devices when indicated   review medications for side effects with activity   pain management  Trust Relationship/Rapport:   thoughts/feelings acknowledged   questions answered   emotional support provided   care explained   choices provided  Goal: Improved Attention and Thought Clarity  Outcome: Met  Intervention: Maximize Cognitive Function  Recent Flowsheet Documentation  Taken 3/28/2025 0923 by Christina Stark RN  Sensory Stimulation Regulation:   care clustered   quiet environment promoted  Reorientation Measures:   calendar in view   clock in view  Goal: Improved Sleep  Outcome: Met     Problem: Pain Acute  Goal: Optimal Pain Control and Function  Outcome: Met  Intervention: Develop Pain Management Plan  Recent Flowsheet Documentation  Taken 3/28/2025 0923 by Christina Stark RN  Pain Management Interventions: medication (see MAR)  Intervention: Prevent or Manage Pain  Recent Flowsheet Documentation  Taken 3/28/2025 0923 by Christina Stark RN  Sensory Stimulation Regulation:   care clustered   quiet  environment promoted  Medication Review/Management: medications reviewed     Problem: Comorbidity Management  Goal: Maintenance of Asthma Control  Outcome: Met  Intervention: Maintain Asthma Symptom Control  Recent Flowsheet Documentation  Taken 3/28/2025 0923 by Christina Stark RN  Medication Review/Management: medications reviewed  Goal: Maintenance of Behavioral Health Symptom Control  Outcome: Met  Intervention: Maintain Behavioral Health Symptom Control  Recent Flowsheet Documentation  Taken 3/28/2025 0923 by Christina Stark RN  Medication Review/Management: medications reviewed  Goal: Maintenance of COPD Symptom Control  Outcome: Met  Intervention: Maintain COPD Symptom Control  Recent Flowsheet Documentation  Taken 3/28/2025 0923 by Christina Stark RN  Medication Review/Management: medications reviewed  Goal: Blood Glucose Levels Within Targeted Range  Outcome: Met  Intervention: Monitor and Manage Glycemia  Recent Flowsheet Documentation  Taken 3/28/2025 0923 by Christina Stark RN  Medication Review/Management: medications reviewed  Goal: Maintenance of Heart Failure Symptom Control  Outcome: Met  Intervention: Maintain Heart Failure Management  Recent Flowsheet Documentation  Taken 3/28/2025 0923 by Christina Stark RN  Medication Review/Management: medications reviewed  Goal: Blood Pressure in Desired Range  Outcome: Met  Intervention: Maintain Blood Pressure Management  Recent Flowsheet Documentation  Taken 3/28/2025 0923 by Christina Stark RN  Medication Review/Management: medications reviewed  Goal: Maintenance of Osteoarthritis Symptom Control  Outcome: Met  Intervention: Maintain Osteoarthritis Symptom Control  Recent Flowsheet Documentation  Taken 3/28/2025 0923 by Christina Stark RN  Assistive Device Utilized: walker  Activity Management: up in chair  Medication Review/Management: medications reviewed  Goal: Bariatric Home Regimen Maintained  Outcome: Met  Intervention: Maintain and Manage  Postbariatric Surgery Care  Recent Flowsheet Documentation  Taken 3/28/2025 0923 by Christina Stark RN  Medication Review/Management: medications reviewed  Goal: Maintenance of Seizure Control  Outcome: Met  Intervention: Maintain Seizure Symptom Control  Recent Flowsheet Documentation  Taken 3/28/2025 0923 by Christina Stakr RN  Sensory Stimulation Regulation:   care clustered   quiet environment promoted  Medication Review/Management: medications reviewed     Problem: Fall Injury Risk  Goal: Absence of Fall and Fall-Related Injury  Outcome: Met  Intervention: Identify and Manage Contributors  Recent Flowsheet Documentation  Taken 3/28/2025 0923 by Christina Stark RN  Medication Review/Management: medications reviewed  Intervention: Promote Injury-Free Environment  Recent Flowsheet Documentation  Taken 3/28/2025 0923 by Christina Stark RN  Safety Promotion/Fall Prevention:   activity supervised   mobility aid in reach   nonskid shoes/slippers when out of bed   lighting adjusted   clutter free environment maintained   patient and family education   safety round/check completed     Problem: Mobility Impairment  Goal: Optimal Mobility  Outcome: Met  Intervention: Optimize Mobility  Recent Flowsheet Documentation  Taken 3/28/2025 0923 by Christina Stark RN  Assistive Device Utilized: walker  Activity Management: up in chair  Positioning/Transfer Devices:   pillows   in use     Problem: Hip Fracture Surgical Repair  Goal: Optimal Coping with Change in Health Status  Outcome: Met  Intervention: Support Psychosocial Response to Surgery and Mobility Changes  Recent Flowsheet Documentation  Taken 3/28/2025 0923 by Christina Stark RN  Family/Support System Care: support provided  Goal: Absence of Bleeding  Outcome: Met  Intervention: Monitor and Manage Bleeding  Recent Flowsheet Documentation  Taken 3/28/2025 0923 by Christina Stark RN  Bleeding Management: dressing monitored  Goal: Effective Bowel Elimination  Outcome:  Met  Goal: Cognitive Function Maintained  Outcome: Met  Intervention: Maintain Cognitive Function  Recent Flowsheet Documentation  Taken 3/28/2025 0923 by Christina Stark RN  Reorientation Measures:   calendar in view   clock in view  Environment Familiarity/Consistency: daily routine followed  Goal: Fluid and Electrolyte Balance  Outcome: Met  Intervention: Monitor and Manage Fluid and Electrolyte Balance  Recent Flowsheet Documentation  Taken 3/28/2025 0923 by Christina Stark RN  Fluid/Electrolyte Management: fluids provided  Goal: Absence of Infection Signs and Symptoms  Outcome: Met  Goal: Optimal Functional Ability  Outcome: Met  Intervention: Promote Optimal Functional Status  Recent Flowsheet Documentation  Taken 3/28/2025 0923 by Christina Stark RN  Activity Management: up in chair  Goal: Anesthesia/Sedation Recovery  Outcome: Met  Intervention: Optimize Anesthesia Recovery  Recent Flowsheet Documentation  Taken 3/28/2025 0923 by Christina Stark RN  Safety Promotion/Fall Prevention:   activity supervised   mobility aid in reach   nonskid shoes/slippers when out of bed   lighting adjusted   clutter free environment maintained   patient and family education   safety round/check completed  Reorientation Measures:   calendar in view   clock in view  Goal: Optimal Pain Control and Function  Outcome: Met  Intervention: Prevent or Manage Pain  Recent Flowsheet Documentation  Taken 3/28/2025 0923 by Christina Stark RN  Pain Management Interventions: medication (see MAR)  Goal: Nausea and Vomiting Relief  Outcome: Met  Goal: Effective Urinary Elimination  Outcome: Met  Goal: Effective Oxygenation and Ventilation  Outcome: Met  Intervention: Optimize Oxygenation and Ventilation  Recent Flowsheet Documentation  Taken 3/28/2025 0923 by Christina Stark RN  Head of Bed (HOB) Positioning: HOB at 30-45 degrees     Problem: Acute Kidney Injury/Impairment  Goal: Fluid and Electrolyte Balance  Outcome: Met  Intervention:  Monitor and Manage Fluid and Electrolyte Balance  Recent Flowsheet Documentation  Taken 3/28/2025 0923 by Christina Stark RN  Fluid/Electrolyte Management: fluids provided  Goal: Improved Oral Intake  Outcome: Met  Goal: Effective Renal Function  Outcome: Met  Intervention: Monitor and Support Renal Function  Recent Flowsheet Documentation  Taken 3/28/2025 0923 by Christina Stark RN  Medication Review/Management: medications reviewed     Problem: Chronic Kidney Disease  Goal: Optimal Coping with Chronic Illness  Outcome: Met  Intervention: Support Psychosocial Response  Recent Flowsheet Documentation  Taken 3/28/2025 0923 by Christina Stark RN  Family/Support System Care: support provided  Goal: Electrolyte Balance  Outcome: Met  Intervention: Monitor and Manage Electrolyte Imbalance  Recent Flowsheet Documentation  Taken 3/28/2025 0923 by Christina Stark RN  Fluid/Electrolyte Management: fluids provided  Goal: Fluid Balance  Outcome: Met  Intervention: Monitor and Manage Hypervolemia  Recent Flowsheet Documentation  Taken 3/28/2025 0923 by Christina Stark RN  Fluid/Electrolyte Management: fluids provided  Goal: Optimal Functional Ability  Outcome: Met  Intervention: Optimize Functional Ability  Recent Flowsheet Documentation  Taken 3/28/2025 0923 by Christina Stark RN  Activity Management: up in chair  Environment Familiarity/Consistency: daily routine followed  Goal: Absence of Anemia Signs and Symptoms  Outcome: Met  Intervention: Manage Signs of Anemia and Bleeding  Recent Flowsheet Documentation  Taken 3/28/2025 0923 by Christina Stark RN  Environmental Support: calm environment promoted  Goal: Optimal Oral Intake  Outcome: Met  Goal: Acceptable Pain Control  Outcome: Met  Intervention: Prevent or Manage Pain  Recent Flowsheet Documentation  Taken 3/28/2025 0923 by Christina Satrk RN  Pain Management Interventions: medication (see MAR)  Goal: Minimize Renal Failure Effects  Outcome: Met  Intervention: Monitor  and Support Renal Function  Recent Flowsheet Documentation  Taken 3/28/2025 0923 by Christina Stark RN  Medication Review/Management: medications reviewed   Goal Outcome Evaluation:  Pt discharging home with home care. Discharge instruction and education has been provided via [employer] . Pt acknowledge the use of insulin pen after demonstration. Daughter is a [place] nurse which would be easier to get help at home. Pt will be follow up out patient Ortho for left hip surgery.

## 2025-03-28 NOTE — PLAN OF CARE
Problem: Adult Inpatient Plan of Care  Goal: Absence of Hospital-Acquired Illness or Injury  Outcome: Progressing  Intervention: Identify and Manage Fall Risk  Recent Flowsheet Documentation  Taken 3/28/2025 0157 by Deanne Navarro RN  Safety Promotion/Fall Prevention:   activity supervised   mobility aid in reach   nonskid shoes/slippers when out of bed   lighting adjusted   clutter free environment maintained   patient and family education   safety round/check completed  Intervention: Prevent Skin Injury  Recent Flowsheet Documentation  Taken 3/28/2025 0621 by Deanne Navarro RN  Body Position: position changed independently  Taken 3/28/2025 0157 by Deanne Navarro RN  Body Position: position changed independently  Intervention: Prevent and Manage VTE (Venous Thromboembolism) Risk  Recent Flowsheet Documentation  Taken 3/28/2025 0157 by Deanne Navarro RN  VTE Prevention/Management: SCDs on (sequential compression devices)  Intervention: Prevent Infection  Recent Flowsheet Documentation  Taken 3/28/2025 0157 by Deanne Navarro RN  Infection Prevention:   hand hygiene promoted   rest/sleep promoted   single patient room provided  Goal: Optimal Comfort and Wellbeing  Outcome: Progressing  Intervention: Monitor Pain and Promote Comfort  Recent Flowsheet Documentation  Taken 3/28/2025 0157 by Deanne Navarro RN  Pain Management Interventions:   emotional support   quiet environment facilitated   medication (see MAR)   rest  Intervention: Provide Person-Centered Care  Recent Flowsheet Documentation  Taken 3/28/2025 0157 by Deanne Navarro RN  Trust Relationship/Rapport:   thoughts/feelings acknowledged   questions answered   emotional support provided   care explained   choices provided     Problem: Pain Acute  Goal: Optimal Pain Control and Function  Outcome: Progressing  Intervention: Develop Pain Management Plan  Recent Flowsheet Documentation  Taken 3/28/2025 0157 by Deanne Navarro RN  Pain Management Interventions:   emotional support   quiet  environment facilitated   medication (see MAR)   rest  Intervention: Prevent or Manage Pain  Recent Flowsheet Documentation  Taken 3/28/2025 0157 by Deanne Navarro RN  Sensory Stimulation Regulation:   care clustered   quiet environment promoted  Medication Review/Management: medications reviewed     Problem: Mobility Impairment  Goal: Optimal Mobility  Outcome: Progressing  Intervention: Optimize Mobility  Recent Flowsheet Documentation  Taken 3/28/2025 0621 by Deanne Navarro RN  Positioning/Transfer Devices:   pillows   in use  Taken 3/28/2025 0157 by Daenne Navarro RN  Assistive Device Utilized:   gait belt   walker  Activity Management:   up to bedside commode   back to bed     Problem: Hip Fracture Surgical Repair  Goal: Absence of Bleeding  Outcome: Progressing  Intervention: Monitor and Manage Bleeding  Recent Flowsheet Documentation  Taken 3/28/2025 0157 by Deanne Navarro RN  Bleeding Management: dressing monitored     Problem: Hip Fracture Surgical Repair  Goal: Absence of Infection Signs and Symptoms  Outcome: Progressing     Problem: Hip Fracture Surgical Repair  Goal: Optimal Functional Ability  Outcome: Progressing  Intervention: Promote Optimal Functional Status  Recent Flowsheet Documentation  Taken 3/28/2025 0157 by Deanne Navarro RN  Activity Management:   up to bedside commode   back to bed     Problem: Acute Kidney Injury/Impairment  Goal: Effective Renal Function  Intervention: Monitor and Support Renal Function  Recent Flowsheet Documentation  Taken 3/28/2025 0157 by Deanne Navarro RN  Medication Review/Management: medications reviewed     Goal Outcome Evaluation:       Pt is A&Ox4. Able to make need known. Speaks Hmong.  is at bedside. Pt report mild pain on L hip, schedule tylenol given for pain. L hip mepliex dressing C/D/I. Ax1, walker up to bedside commode. Pt slept in between cares.

## 2025-04-01 ENCOUNTER — APPOINTMENT (OUTPATIENT)
Dept: INTERPRETER SERVICES | Facility: CLINIC | Age: 65
End: 2025-04-01
Payer: MEDICARE

## 2025-04-01 ENCOUNTER — PATIENT OUTREACH (OUTPATIENT)
Dept: CARE COORDINATION | Facility: CLINIC | Age: 65
End: 2025-04-01
Payer: MEDICARE

## 2025-04-01 NOTE — PROGRESS NOTES
[facility]: Transitions of Care Outreach  Chief Complaint   Patient presents with    Clinic Care Coordination - Post Hospital       Most Recent Admission Date: 3/24/2025   Most Recent Admission Diagnosis: Hip pain, left - M25.552  Fall, initial encounter - W19.XXXA  Closed nondisplaced intertrochanteric fracture of left femur, initial encounter (H) - S72.145A  Hip fracture, left, closed, initial encounter (H) - S72.002A  Uncontrolled type 2 diabetes mellitus with hyperglycemia (H) - E11.65     Most Recent Discharge Date: 3/28/2025   Most Recent Discharge Diagnosis: Hip pain, left - M25.552  Fall, initial encounter - W19.XXXA  Closed nondisplaced intertrochanteric fracture of left femur, initial encounter (H) - S72.145A  Hip fracture, left, closed, initial encounter (H) - S72.002A  Uncontrolled type 2 diabetes mellitus with hyperglycemia (H) - E11.65  Age-related osteoporosis with current pathological fracture, initial encounter - M80.00XA     Transitions of Care Assessment    Discharge Assessment  How are you doing now that you are home?: I am doing good. I do have some pain on my foot.  How are your symptoms? (Red Flag symptoms escalate to triage hotline per guidelines): Unchanged  Do you know how to contact your clinic care team if you have future questions or changes to your health status? : Yes  Does the patient have their discharge instructions? : Yes  Does the patient have questions regarding their discharge instructions? : No  Were you started on any new medications or were there changes to any of your previous medications? : Yes  Does the patient have all of their medications?: Yes  Do you have questions regarding any of your medications? : No  Do you have all of your needed medical supplies or equipment (DME)?  (i.e. oxygen tank, CPAP, cane, etc.): Yes    Post-op ([facility] Only)  If the patient had a surgery or procedure, do they have any questions for a nurse?: No             Follow up  Plan     Discharge Follow-Up  Discharge follow up appointment scheduled in alignment with recommended follow up timeframe or Transitions of Risk Category? (Low = within 30 days; Moderate= within 14 days; High= within 7 days): Yes (Pt said she has a follow up scheduled)  Discharge Follow Up Appointment Scheduled with?: Specialty Care Provider    No future appointments.    Outpatient Plan as outlined on AVS reviewed with patient.    For any urgent concerns, please contact our 24 hour nurse triage line: [phone] ([phone])       CANDY Peña  [phone]  Connected Care Resource [facility]

## (undated) DEVICE — KIT PATIENT CARE HANA PROFX W/BOOT LINER SUPINE 6851

## (undated) DEVICE — ESU PENCIL SMOKE EVAC W/ROCKER SWITCH 0703-047-000

## (undated) DEVICE — GLOVE BIOGEL PI ULTRATOUCH G SZ 7.5 42175

## (undated) DEVICE — WIRE GUIDE 3.2X400MM  357.399

## (undated) DEVICE — GLOVE BIOGEL PI INDICATOR 8.0 LF 41680

## (undated) DEVICE — SUTURE MONOCRYL 3-0 18 PS2 UND MCP497G

## (undated) DEVICE — DRAPE STERI U 1015

## (undated) DEVICE — GLOVE SURG PI ULTRA TOUCH M SZ 7 LF 42670

## (undated) DEVICE — DRILL BIT QUICK COUPLING 3 FLUTE 4.2MMX330/100MM CALIBRATE

## (undated) DEVICE — DRSG GAUZE 4X4" TRAY 6939

## (undated) DEVICE — DRAPE C-ARM 60X42" 1013

## (undated) DEVICE — SU DERMABOND ADVANCED .7ML DNX12

## (undated) DEVICE — DRILL BIT CANNULATED 16MM HOLLOW STER 03.037.004S

## (undated) DEVICE — CUSTOM PACK GEN MAJOR SBA5BGMHEA

## (undated) DEVICE — SOL NACL 0.9% IRRIG 1000ML BOTTLE 2F7124

## (undated) DEVICE — MAT FLOOR WATERPROOF BACKSHEET FMBP30

## (undated) DEVICE — SU VICRYL+ 0 27IN CT-1 UND VCP260H

## (undated) DEVICE — DRAPE IOBAN ISOLATION VERTICAL 320X21CM 6617

## (undated) DEVICE — SUTURE VICRYL+ 2-0 27IN CT-1 UND VCP259H

## (undated) DEVICE — GLOVE UNDER INDICATOR PI SZ 7.0 LF 41670

## (undated) DEVICE — PREP CHLORAPREP 26ML TINTED HI-LITE ORANGE 930815

## (undated) DEVICE — DRSG MEPILEX BORDER FLEX 3X3" 595200

## (undated) DEVICE — SU VICRYL+ 3-0 CT1 36IN UND VCP944H

## (undated) DEVICE — DRAPE C-ARMOR 5 SIDED 5523

## (undated) DEVICE — SOL WATER IRRIG 1000ML BOTTLE 2F7114

## (undated) DEVICE — ESU GROUND PAD ADULT REM W/15' CORD E7507DB

## (undated) RX ORDER — FENTANYL CITRATE 50 UG/ML
INJECTION, SOLUTION INTRAMUSCULAR; INTRAVENOUS
Status: DISPENSED
Start: 2025-03-25